# Patient Record
Sex: MALE | Race: WHITE | NOT HISPANIC OR LATINO | Employment: OTHER | URBAN - METROPOLITAN AREA
[De-identification: names, ages, dates, MRNs, and addresses within clinical notes are randomized per-mention and may not be internally consistent; named-entity substitution may affect disease eponyms.]

---

## 2024-04-05 ENCOUNTER — APPOINTMENT (OUTPATIENT)
Dept: CARDIOLOGY | Facility: HOSPITAL | Age: 84
DRG: 065 | End: 2024-04-05
Payer: MEDICARE

## 2024-04-05 ENCOUNTER — APPOINTMENT (OUTPATIENT)
Dept: RADIOLOGY | Facility: HOSPITAL | Age: 84
DRG: 065 | End: 2024-04-05
Payer: MEDICARE

## 2024-04-05 ENCOUNTER — HOSPITAL ENCOUNTER (OUTPATIENT)
Facility: HOSPITAL | Age: 84
Discharge: HOME | DRG: 065 | End: 2024-04-06
Attending: STUDENT IN AN ORGANIZED HEALTH CARE EDUCATION/TRAINING PROGRAM | Admitting: INTERNAL MEDICINE
Payer: MEDICARE

## 2024-04-05 DIAGNOSIS — R29.90 STROKE-LIKE EPISODE: ICD-10-CM

## 2024-04-05 DIAGNOSIS — I63.89 AC ISCH MULTI VASC TERRITORIES STROKE (MULTI): ICD-10-CM

## 2024-04-05 DIAGNOSIS — I61.9 CVA (CEREBROVASCULAR ACCIDENT DUE TO INTRACEREBRAL HEMORRHAGE) (MULTI): Primary | ICD-10-CM

## 2024-04-05 LAB
ALBUMIN SERPL BCP-MCNC: 3.5 G/DL (ref 3.4–5)
ALP SERPL-CCNC: 88 U/L (ref 33–136)
ALT SERPL W P-5'-P-CCNC: 8 U/L (ref 10–52)
ANION GAP SERPL CALC-SCNC: 14 MMOL/L (ref 10–20)
APTT PPP: 34 SECONDS (ref 27–38)
AST SERPL W P-5'-P-CCNC: 12 U/L (ref 9–39)
BASOPHILS # BLD AUTO: 0.03 X10*3/UL (ref 0–0.1)
BASOPHILS NFR BLD AUTO: 0.4 %
BILIRUB SERPL-MCNC: 0.6 MG/DL (ref 0–1.2)
BUN SERPL-MCNC: 26 MG/DL (ref 6–23)
CALCIUM SERPL-MCNC: 8.5 MG/DL (ref 8.6–10.3)
CARDIAC TROPONIN I PNL SERPL HS: 20 NG/L (ref 0–20)
CHLORIDE SERPL-SCNC: 105 MMOL/L (ref 98–107)
CO2 SERPL-SCNC: 25 MMOL/L (ref 21–32)
CREAT SERPL-MCNC: 2.02 MG/DL (ref 0.5–1.3)
EGFRCR SERPLBLD CKD-EPI 2021: 32 ML/MIN/1.73M*2
EOSINOPHIL # BLD AUTO: 0.2 X10*3/UL (ref 0–0.4)
EOSINOPHIL NFR BLD AUTO: 2.6 %
ERYTHROCYTE [DISTWIDTH] IN BLOOD BY AUTOMATED COUNT: 16.7 % (ref 11.5–14.5)
GLUCOSE BLD MANUAL STRIP-MCNC: 85 MG/DL (ref 74–99)
GLUCOSE SERPL-MCNC: 97 MG/DL (ref 74–99)
HCT VFR BLD AUTO: 37.6 % (ref 41–52)
HGB BLD-MCNC: 11.3 G/DL (ref 13.5–17.5)
IMM GRANULOCYTES # BLD AUTO: 0.02 X10*3/UL (ref 0–0.5)
IMM GRANULOCYTES NFR BLD AUTO: 0.3 % (ref 0–0.9)
INR PPP: 1.1 (ref 0.9–1.1)
LYMPHOCYTES # BLD AUTO: 1.28 X10*3/UL (ref 0.8–3)
LYMPHOCYTES NFR BLD AUTO: 16.4 %
MCH RBC QN AUTO: 24.6 PG (ref 26–34)
MCHC RBC AUTO-ENTMCNC: 30.1 G/DL (ref 32–36)
MCV RBC AUTO: 82 FL (ref 80–100)
MONOCYTES # BLD AUTO: 0.39 X10*3/UL (ref 0.05–0.8)
MONOCYTES NFR BLD AUTO: 5 %
NEUTROPHILS # BLD AUTO: 5.88 X10*3/UL (ref 1.6–5.5)
NEUTROPHILS NFR BLD AUTO: 75.3 %
NRBC BLD-RTO: 0 /100 WBCS (ref 0–0)
PLATELET # BLD AUTO: 201 X10*3/UL (ref 150–450)
POTASSIUM SERPL-SCNC: 4 MMOL/L (ref 3.5–5.3)
PROT SERPL-MCNC: 6 G/DL (ref 6.4–8.2)
PROTHROMBIN TIME: 12.9 SECONDS (ref 9.8–12.8)
RBC # BLD AUTO: 4.59 X10*6/UL (ref 4.5–5.9)
SODIUM SERPL-SCNC: 140 MMOL/L (ref 136–145)
WBC # BLD AUTO: 7.8 X10*3/UL (ref 4.4–11.3)

## 2024-04-05 PROCEDURE — 70496 CT ANGIOGRAPHY HEAD: CPT | Performed by: RADIOLOGY

## 2024-04-05 PROCEDURE — 84484 ASSAY OF TROPONIN QUANT: CPT

## 2024-04-05 PROCEDURE — 85025 COMPLETE CBC W/AUTO DIFF WBC: CPT

## 2024-04-05 PROCEDURE — 93005 ELECTROCARDIOGRAM TRACING: CPT

## 2024-04-05 PROCEDURE — 70496 CT ANGIOGRAPHY HEAD: CPT

## 2024-04-05 PROCEDURE — 99291 CRITICAL CARE FIRST HOUR: CPT | Performed by: STUDENT IN AN ORGANIZED HEALTH CARE EDUCATION/TRAINING PROGRAM

## 2024-04-05 PROCEDURE — 70553 MRI BRAIN STEM W/O & W/DYE: CPT

## 2024-04-05 PROCEDURE — A9575 INJ GADOTERATE MEGLUMI 0.1ML: HCPCS | Performed by: INTERNAL MEDICINE

## 2024-04-05 PROCEDURE — 99222 1ST HOSP IP/OBS MODERATE 55: CPT | Performed by: NURSE PRACTITIONER

## 2024-04-05 PROCEDURE — 80053 COMPREHEN METABOLIC PANEL: CPT

## 2024-04-05 PROCEDURE — 82947 ASSAY GLUCOSE BLOOD QUANT: CPT | Mod: 59

## 2024-04-05 PROCEDURE — 2550000001 HC RX 255 CONTRASTS: Performed by: STUDENT IN AN ORGANIZED HEALTH CARE EDUCATION/TRAINING PROGRAM

## 2024-04-05 PROCEDURE — 2500000004 HC RX 250 GENERAL PHARMACY W/ HCPCS (ALT 636 FOR OP/ED)

## 2024-04-05 PROCEDURE — 2550000001 HC RX 255 CONTRASTS: Performed by: INTERNAL MEDICINE

## 2024-04-05 PROCEDURE — 70498 CT ANGIOGRAPHY NECK: CPT | Performed by: RADIOLOGY

## 2024-04-05 PROCEDURE — 92610 EVALUATE SWALLOWING FUNCTION: CPT | Mod: GN | Performed by: SPEECH-LANGUAGE PATHOLOGIST

## 2024-04-05 PROCEDURE — 36415 COLL VENOUS BLD VENIPUNCTURE: CPT

## 2024-04-05 PROCEDURE — 70450 CT HEAD/BRAIN W/O DYE: CPT | Performed by: RADIOLOGY

## 2024-04-05 PROCEDURE — 85730 THROMBOPLASTIN TIME PARTIAL: CPT

## 2024-04-05 PROCEDURE — 70553 MRI BRAIN STEM W/O & W/DYE: CPT | Performed by: RADIOLOGY

## 2024-04-05 PROCEDURE — 85610 PROTHROMBIN TIME: CPT

## 2024-04-05 PROCEDURE — 99236 HOSP IP/OBS SAME DATE HI 85: CPT

## 2024-04-05 PROCEDURE — 70450 CT HEAD/BRAIN W/O DYE: CPT

## 2024-04-05 PROCEDURE — 2500000001 HC RX 250 WO HCPCS SELF ADMINISTERED DRUGS (ALT 637 FOR MEDICARE OP)

## 2024-04-05 RX ORDER — TAMSULOSIN HYDROCHLORIDE 0.4 MG/1
0.8 CAPSULE ORAL DAILY
COMMUNITY

## 2024-04-05 RX ORDER — NITROGLYCERIN 0.4 MG/1
0.4 TABLET SUBLINGUAL EVERY 5 MIN PRN
COMMUNITY

## 2024-04-05 RX ORDER — HYDRALAZINE HYDROCHLORIDE 25 MG/1
25 TABLET, FILM COATED ORAL 2 TIMES DAILY
COMMUNITY

## 2024-04-05 RX ORDER — NAPROXEN SODIUM 220 MG/1
81 TABLET, FILM COATED ORAL DAILY
COMMUNITY

## 2024-04-05 RX ORDER — LIDOCAINE 50 MG/G
1 OINTMENT TOPICAL 2 TIMES DAILY PRN
COMMUNITY

## 2024-04-05 RX ORDER — ENOXAPARIN SODIUM 100 MG/ML
30 INJECTION SUBCUTANEOUS EVERY 24 HOURS
Status: DISCONTINUED | OUTPATIENT
Start: 2024-04-05 | End: 2024-04-06 | Stop reason: HOSPADM

## 2024-04-05 RX ORDER — POLYETHYLENE GLYCOL 3350 17 G/17G
17 POWDER, FOR SOLUTION ORAL DAILY
Status: DISCONTINUED | OUTPATIENT
Start: 2024-04-05 | End: 2024-04-06 | Stop reason: HOSPADM

## 2024-04-05 RX ORDER — LOPERAMIDE HCL 2 MG
2 TABLET ORAL EVERY 6 HOURS PRN
COMMUNITY

## 2024-04-05 RX ORDER — FINASTERIDE 5 MG/1
5 TABLET, FILM COATED ORAL NIGHTLY
COMMUNITY

## 2024-04-05 RX ORDER — DOCUSATE SODIUM 100 MG/1
100 CAPSULE, LIQUID FILLED ORAL EVERY 12 HOURS PRN
Status: DISCONTINUED | OUTPATIENT
Start: 2024-04-05 | End: 2024-04-06 | Stop reason: HOSPADM

## 2024-04-05 RX ORDER — DOCUSATE SODIUM 100 MG/1
100 CAPSULE, LIQUID FILLED ORAL EVERY 12 HOURS PRN
COMMUNITY

## 2024-04-05 RX ORDER — BENZOCAINE AND MENTHOL, UNSPECIFIED FORM 15; 2.3 MG/1; MG/1
1 LOZENGE ORAL EVERY 2 HOUR PRN
COMMUNITY

## 2024-04-05 RX ORDER — MIRTAZAPINE 7.5 MG/1
7.5 TABLET, FILM COATED ORAL NIGHTLY
COMMUNITY

## 2024-04-05 RX ORDER — FUROSEMIDE 40 MG/1
40 TABLET ORAL AS NEEDED
COMMUNITY

## 2024-04-05 RX ORDER — ACETAMINOPHEN 325 MG/1
650 TABLET ORAL EVERY 4 HOURS PRN
COMMUNITY

## 2024-04-05 RX ORDER — FLUTICASONE PROPIONATE 50 MCG
2 SPRAY, SUSPENSION (ML) NASAL DAILY PRN
COMMUNITY

## 2024-04-05 RX ORDER — NAPROXEN SODIUM 220 MG/1
81 TABLET, FILM COATED ORAL DAILY
Status: DISCONTINUED | OUTPATIENT
Start: 2024-04-05 | End: 2024-04-06 | Stop reason: HOSPADM

## 2024-04-05 RX ORDER — ATORVASTATIN CALCIUM 40 MG/1
40 TABLET, FILM COATED ORAL NIGHTLY
Status: DISCONTINUED | OUTPATIENT
Start: 2024-04-05 | End: 2024-04-05

## 2024-04-05 RX ORDER — ATORVASTATIN CALCIUM 80 MG/1
80 TABLET, FILM COATED ORAL NIGHTLY
Status: DISCONTINUED | OUTPATIENT
Start: 2024-04-05 | End: 2024-04-06 | Stop reason: HOSPADM

## 2024-04-05 RX ORDER — GADOTERATE MEGLUMINE 376.9 MG/ML
12 INJECTION INTRAVENOUS
Status: COMPLETED | OUTPATIENT
Start: 2024-04-05 | End: 2024-04-05

## 2024-04-05 RX ORDER — FLUOXETINE HYDROCHLORIDE 20 MG/1
40 CAPSULE ORAL DAILY
COMMUNITY

## 2024-04-05 RX ORDER — ISOSORBIDE DINITRATE 10 MG/1
10 TABLET ORAL 3 TIMES DAILY
COMMUNITY

## 2024-04-05 RX ORDER — ALLOPURINOL 100 MG/1
100 TABLET ORAL DAILY
COMMUNITY

## 2024-04-05 RX ORDER — METOPROLOL SUCCINATE 25 MG/1
25 TABLET, EXTENDED RELEASE ORAL DAILY
COMMUNITY

## 2024-04-05 RX ORDER — ATORVASTATIN CALCIUM 80 MG/1
80 TABLET, FILM COATED ORAL NIGHTLY
COMMUNITY

## 2024-04-05 RX ADMIN — ATORVASTATIN CALCIUM 80 MG: 80 TABLET, FILM COATED ORAL at 21:04

## 2024-04-05 RX ADMIN — IOHEXOL 70 ML: 350 INJECTION, SOLUTION INTRAVENOUS at 11:49

## 2024-04-05 RX ADMIN — ENOXAPARIN SODIUM 30 MG: 30 INJECTION SUBCUTANEOUS at 21:04

## 2024-04-05 RX ADMIN — GADOTERATE MEGLUMINE 12 ML: 376.9 INJECTION INTRAVENOUS at 17:45

## 2024-04-05 SDOH — SOCIAL STABILITY: SOCIAL INSECURITY: DO YOU FEEL ANYONE HAS EXPLOITED OR TAKEN ADVANTAGE OF YOU FINANCIALLY OR OF YOUR PERSONAL PROPERTY?: NO

## 2024-04-05 SDOH — SOCIAL STABILITY: SOCIAL INSECURITY: ABUSE: ADULT

## 2024-04-05 SDOH — SOCIAL STABILITY: SOCIAL INSECURITY: ARE THERE ANY APPARENT SIGNS OF INJURIES/BEHAVIORS THAT COULD BE RELATED TO ABUSE/NEGLECT?: NO

## 2024-04-05 SDOH — SOCIAL STABILITY: SOCIAL INSECURITY: ARE YOU OR HAVE YOU BEEN THREATENED OR ABUSED PHYSICALLY, EMOTIONALLY, OR SEXUALLY BY ANYONE?: NO

## 2024-04-05 SDOH — SOCIAL STABILITY: SOCIAL INSECURITY: DO YOU FEEL UNSAFE GOING BACK TO THE PLACE WHERE YOU ARE LIVING?: NO

## 2024-04-05 SDOH — SOCIAL STABILITY: SOCIAL INSECURITY: HAS ANYONE EVER THREATENED TO HURT YOUR FAMILY OR YOUR PETS?: NO

## 2024-04-05 SDOH — SOCIAL STABILITY: SOCIAL INSECURITY: DOES ANYONE TRY TO KEEP YOU FROM HAVING/CONTACTING OTHER FRIENDS OR DOING THINGS OUTSIDE YOUR HOME?: NO

## 2024-04-05 SDOH — SOCIAL STABILITY: SOCIAL INSECURITY: WERE YOU ABLE TO COMPLETE ALL THE BEHAVIORAL HEALTH SCREENINGS?: YES

## 2024-04-05 SDOH — SOCIAL STABILITY: SOCIAL INSECURITY: HAVE YOU HAD THOUGHTS OF HARMING ANYONE ELSE?: NO

## 2024-04-05 ASSESSMENT — ACTIVITIES OF DAILY LIVING (ADL)
LACK_OF_TRANSPORTATION: NO
ASSISTIVE_DEVICE: WHEELCHAIR
PATIENT'S MEMORY ADEQUATE TO SAFELY COMPLETE DAILY ACTIVITIES?: YES
TOILETING: NEEDS ASSISTANCE
JUDGMENT_ADEQUATE_SAFELY_COMPLETE_DAILY_ACTIVITIES: YES
HEARING - RIGHT EAR: FUNCTIONAL
WALKS IN HOME: DEPENDENT
HEARING - LEFT EAR: FUNCTIONAL
DRESSING YOURSELF: DEPENDENT
GROOMING: NEEDS ASSISTANCE
ADEQUATE_TO_COMPLETE_ADL: YES
BATHING: NEEDS ASSISTANCE
FEEDING YOURSELF: NEEDS ASSISTANCE

## 2024-04-05 ASSESSMENT — PAIN SCALES - GENERAL
PAINLEVEL_OUTOF10: 5 - MODERATE PAIN
PAINLEVEL_OUTOF10: 4
PAINLEVEL_OUTOF10: 5 - MODERATE PAIN
PAINLEVEL_OUTOF10: 3

## 2024-04-05 ASSESSMENT — COGNITIVE AND FUNCTIONAL STATUS - GENERAL
MOVING FROM LYING ON BACK TO SITTING ON SIDE OF FLAT BED WITH BEDRAILS: A LITTLE
DAILY ACTIVITIY SCORE: 12
MOBILITY SCORE: 13
CLIMB 3 TO 5 STEPS WITH RAILING: A LOT
PERSONAL GROOMING: A LOT
PERSONAL GROOMING: A LOT
WALKING IN HOSPITAL ROOM: A LOT
DAILY ACTIVITIY SCORE: 12
TOILETING: TOTAL
CLIMB 3 TO 5 STEPS WITH RAILING: A LOT
HELP NEEDED FOR BATHING: A LOT
EATING MEALS: A LITTLE
MOVING FROM LYING ON BACK TO SITTING ON SIDE OF FLAT BED WITH BEDRAILS: A LITTLE
TURNING FROM BACK TO SIDE WHILE IN FLAT BAD: A LOT
HELP NEEDED FOR BATHING: A LOT
PATIENT BASELINE BEDBOUND: NO
DRESSING REGULAR LOWER BODY CLOTHING: A LOT
STANDING UP FROM CHAIR USING ARMS: A LOT
STANDING UP FROM CHAIR USING ARMS: A LOT
MOVING TO AND FROM BED TO CHAIR: A LOT
MOBILITY SCORE: 13
MOVING TO AND FROM BED TO CHAIR: A LOT
TOILETING: TOTAL
DRESSING REGULAR UPPER BODY CLOTHING: A LOT
TURNING FROM BACK TO SIDE WHILE IN FLAT BAD: A LOT
DRESSING REGULAR UPPER BODY CLOTHING: A LOT
DRESSING REGULAR LOWER BODY CLOTHING: A LOT
EATING MEALS: A LITTLE
WALKING IN HOSPITAL ROOM: A LOT

## 2024-04-05 ASSESSMENT — PAIN DESCRIPTION - PROGRESSION: CLINICAL_PROGRESSION: NOT CHANGED

## 2024-04-05 ASSESSMENT — COLUMBIA-SUICIDE SEVERITY RATING SCALE - C-SSRS
1. IN THE PAST MONTH, HAVE YOU WISHED YOU WERE DEAD OR WISHED YOU COULD GO TO SLEEP AND NOT WAKE UP?: NO
6. HAVE YOU EVER DONE ANYTHING, STARTED TO DO ANYTHING, OR PREPARED TO DO ANYTHING TO END YOUR LIFE?: NO
2. HAVE YOU ACTUALLY HAD ANY THOUGHTS OF KILLING YOURSELF?: NO

## 2024-04-05 ASSESSMENT — LIFESTYLE VARIABLES
AUDIT-C TOTAL SCORE: 0
AUDIT-C TOTAL SCORE: 0
HOW OFTEN DO YOU HAVE A DRINK CONTAINING ALCOHOL: NEVER
HOW MANY STANDARD DRINKS CONTAINING ALCOHOL DO YOU HAVE ON A TYPICAL DAY: PATIENT DOES NOT DRINK
SKIP TO QUESTIONS 9-10: 1
HOW OFTEN DO YOU HAVE 6 OR MORE DRINKS ON ONE OCCASION: NEVER

## 2024-04-05 ASSESSMENT — ENCOUNTER SYMPTOMS
DIZZINESS: 0
HEADACHES: 0
FACIAL ASYMMETRY: 0
TREMORS: 0
FEVER: 0
SHORTNESS OF BREATH: 0
WEAKNESS: 0
NUMBNESS: 0
VOMITING: 0
SPEECH DIFFICULTY: 0
NAUSEA: 0
CHEST TIGHTNESS: 0
LIGHT-HEADEDNESS: 0

## 2024-04-05 ASSESSMENT — PATIENT HEALTH QUESTIONNAIRE - PHQ9
1. LITTLE INTEREST OR PLEASURE IN DOING THINGS: NOT AT ALL
2. FEELING DOWN, DEPRESSED OR HOPELESS: NOT AT ALL
SUM OF ALL RESPONSES TO PHQ9 QUESTIONS 1 & 2: 0

## 2024-04-05 ASSESSMENT — PAIN - FUNCTIONAL ASSESSMENT: PAIN_FUNCTIONAL_ASSESSMENT: 0-10

## 2024-04-05 NOTE — H&P
"History Of Present Illness  Cuong Cheek is a 84 y.o. male CAD (s/p BMS to LAD in 2008), HTN, HLD, EtOH use disorder, tobacco use disorder, CKD, PAD, Gout, BPH s/p TURP presenting from his nursing facility after falling out of his wheelchair earlier today.  Patient remembers the event and says that he was reaching down to pick something up off the ground when he slid forward out of his chair and hit his head on a piece of furniture.  Nursing home staff then came to evaluate the patient and noticed that he was having left-sided arm and leg weakness as well as left facial droop and \"garbled speech\".  EMS was called and on their assessment he was having similar deficits specifically the left leg weakness.  He was brought to the emergency department and a brain attack was called and neurology was called.  Initial NIH at that time was 5.  CT head performed and revealed no acute hemorrhage however did show chronic findings (see below).  CT angio head and neck with and without contrast revealed multiple occlusions and other findings that based on Dr. Littlejohn's assessment appeared to be chronic.  He was not given TNK.  Upon returning from imaging, his NIH stroke scale was a 1, and his weakness, garbled speech, and facial droop had all resolved.  Patient says that he remembers the entire incident and had no precipitating symptoms such as chest pain, shortness of breath, lightheadedness, dizziness, visual changes, nausea, vomiting.  At present he denies having any pain other than the discomfort of sitting in the hospital bed.  His daughter is at bedside and reports that he is at his baseline mentally and physically in her assessment.  Patient to be admitted for further workup of stroke versus TIA versus seizure activity with neurology on consult.    ED course  Initial vitals: Afebrile 36.4, HR 86, RR 16, /68, 98% on room air  Labs: CMP significant for BUN/creatinine of 26/2.02 (appears to be near baseline), normal " electrolytes.  CBC reveals H&H 11.3/37.6 (baseline), no leukocytosis or thrombocytopenia.  PT/INR 1.1/12.9 respectively.  Imaging: CT brain attack head revealed no acute hemorrhage. Other findings are chronic- afua of  edema of the right posterior parietal and trigone region has density value would suggest subacute possibly chronic process, Right parotid gland mass. CT angio brain attack revealed chronic changes (see report below)  EKG: Normal sinus rhythm no acute injury pattern  ED interventions: Brain attack called and neurology consulted.         Past Medical History  He has no past medical history on file.    Surgical History  He has no past surgical history on file.     Social History  He has no history on file for tobacco use, alcohol use, and drug use.    Family History  No family history on file.     Allergies  Patient has no known allergies.    Review of Systems   Constitutional:  Negative for fever.   Respiratory:  Negative for chest tightness and shortness of breath.    Cardiovascular:  Negative for chest pain.   Gastrointestinal:  Negative for nausea and vomiting.   Neurological:  Negative for dizziness, tremors, facial asymmetry, speech difficulty, weakness, light-headedness, numbness and headaches.   All other systems reviewed and are negative.       Physical Exam  Vitals reviewed.   Constitutional:       General: He is not in acute distress.     Appearance: Normal appearance. He is not toxic-appearing.   HENT:      Head: Normocephalic and atraumatic.   Eyes:      General: No visual field deficit.  Cardiovascular:      Rate and Rhythm: Normal rate and regular rhythm.      Heart sounds: No murmur heard.     No friction rub. No gallop.   Pulmonary:      Effort: Pulmonary effort is normal. No respiratory distress.      Breath sounds: Normal breath sounds.   Skin:     General: Skin is warm and dry.   Neurological:      General: No focal deficit present.      Mental Status: He is alert. Mental status is at  baseline.      GCS: GCS eye subscore is 4. GCS verbal subscore is 5. GCS motor subscore is 6.      Cranial Nerves: No facial asymmetry.      Sensory: No sensory deficit.      Motor: No weakness or pronator drift.      Comments: NIH of 1 on reevaluation   Psychiatric:         Mood and Affect: Mood normal.         Behavior: Behavior normal.          Last Recorded Vitals  /68 (BP Location: Right arm, Patient Position: Sitting)   Pulse 57   Temp 36.4 °C (97.5 °F) (Temporal)   Resp 18   Wt 60 kg (132 lb 4.4 oz)   SpO2 99%     Relevant Results        Scheduled medications  aspirin, 81 mg, oral, Daily  atorvastatin, 80 mg, oral, Nightly      Continuous medications     PRN medications  Results for orders placed or performed during the hospital encounter of 04/05/24 (from the past 24 hour(s))   CBC and Auto Differential   Result Value Ref Range    WBC 7.8 4.4 - 11.3 x10*3/uL    nRBC 0.0 0.0 - 0.0 /100 WBCs    RBC 4.59 4.50 - 5.90 x10*6/uL    Hemoglobin 11.3 (L) 13.5 - 17.5 g/dL    Hematocrit 37.6 (L) 41.0 - 52.0 %    MCV 82 80 - 100 fL    MCH 24.6 (L) 26.0 - 34.0 pg    MCHC 30.1 (L) 32.0 - 36.0 g/dL    RDW 16.7 (H) 11.5 - 14.5 %    Platelets 201 150 - 450 x10*3/uL    Neutrophils % 75.3 40.0 - 80.0 %    Immature Granulocytes %, Automated 0.3 0.0 - 0.9 %    Lymphocytes % 16.4 13.0 - 44.0 %    Monocytes % 5.0 2.0 - 10.0 %    Eosinophils % 2.6 0.0 - 6.0 %    Basophils % 0.4 0.0 - 2.0 %    Neutrophils Absolute 5.88 (H) 1.60 - 5.50 x10*3/uL    Immature Granulocytes Absolute, Automated 0.02 0.00 - 0.50 x10*3/uL    Lymphocytes Absolute 1.28 0.80 - 3.00 x10*3/uL    Monocytes Absolute 0.39 0.05 - 0.80 x10*3/uL    Eosinophils Absolute 0.20 0.00 - 0.40 x10*3/uL    Basophils Absolute 0.03 0.00 - 0.10 x10*3/uL   Comprehensive metabolic panel   Result Value Ref Range    Glucose 97 74 - 99 mg/dL    Sodium 140 136 - 145 mmol/L    Potassium 4.0 3.5 - 5.3 mmol/L    Chloride 105 98 - 107 mmol/L    Bicarbonate 25 21 - 32 mmol/L     Anion Gap 14 10 - 20 mmol/L    Urea Nitrogen 26 (H) 6 - 23 mg/dL    Creatinine 2.02 (H) 0.50 - 1.30 mg/dL    eGFR 32 (L) >60 mL/min/1.73m*2    Calcium 8.5 (L) 8.6 - 10.3 mg/dL    Albumin 3.5 3.4 - 5.0 g/dL    Alkaline Phosphatase 88 33 - 136 U/L    Total Protein 6.0 (L) 6.4 - 8.2 g/dL    AST 12 9 - 39 U/L    Bilirubin, Total 0.6 0.0 - 1.2 mg/dL    ALT 8 (L) 10 - 52 U/L   Troponin I, High Sensitivity   Result Value Ref Range    Troponin I, High Sensitivity 20 0 - 20 ng/L   Protime-INR   Result Value Ref Range    Protime 12.9 (H) 9.8 - 12.8 seconds    INR 1.1 0.9 - 1.1   APTT   Result Value Ref Range    aPTT 34 27 - 38 seconds   POCT GLUCOSE   Result Value Ref Range    POCT Glucose 85 74 - 99 mg/dL   ECG 12 lead   Result Value Ref Range    Ventricular Rate 77 BPM    Atrial Rate 77 BPM    WY Interval 178 ms    QRS Duration 94 ms    QT Interval 350 ms    QTC Calculation(Bazett) 396 ms    P Axis 63 degrees    R Axis 62 degrees    T Axis 254 degrees    QRS Count 13 beats    Q Onset 221 ms    P Onset 132 ms    P Offset 178 ms    T Offset 396 ms    QTC Fredericia 380 ms   ECG 12 lead   Result Value Ref Range    Ventricular Rate 69 BPM    Atrial Rate 69 BPM    WY Interval 152 ms    QRS Duration 96 ms    QT Interval 432 ms    QTC Calculation(Bazett) 462 ms    P Axis 46 degrees    R Axis 96 degrees    T Axis 243 degrees    QRS Count 11 beats    Q Onset 221 ms    P Onset 145 ms    P Offset 178 ms    T Offset 437 ms    QTC Fredericia 452 ms     ECG 12 lead    Result Date: 4/5/2024  Normal sinus rhythm Rightward axis ST & T wave abnormality, consider inferior ischemia Prolonged QT Abnormal ECG When compared with ECG of 05-APR-2024 11:37, (unconfirmed) QT has lengthened    ECG 12 lead    Result Date: 4/5/2024  Normal sinus rhythm Septal infarct , age undetermined ST & T wave abnormality, consider inferior ischemia Abnormal ECG No previous ECGs available    CT brain attack head wo IV contrast    Result Date: 4/5/2024  Interpreted  By:  Mirza Gregorio, STUDY: CT BRAIN ATTACK HEAD WO IV CONTRAST;  4/5/2024 11:26 am   INDICATION: Signs/Symptoms:Stroke Evaluation, left visual field deficit, left leg weakness.   COMPARISON: No prior examination available for comparison. If a prior examination becomes available, this examination will be compared to the prior study. Addendum report will be issued.   ACCESSION NUMBER(S): [id number]   ORDERING CLINICIAN: NOEL VALDEZ   TECHNIQUE: Noncontrast axial CT scan of head was performed. Angled reformats in brain and bone windows were generated. The images were reviewed in bone, brain, blood and soft tissue windows.   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 Follow-up ultrasound recommended.   Cerebral atrophy with concordant ventriculomegaly, age-appropriate.   There is low-attenuation of the cortical and subcortical white matter of the right occipital and parietal regions and right posterior trigone. Density value would suggest subacute or chronic.  MRI is best suited for determining acuity. No significant mass effect. No hemorrhage identified.   Small old lacunar infarct of the deep white matter right side axial image 18 measuring 4 mm.       There is no acute hemorrhage. Other findings appear chronic. Area of edema of the right posterior parietal and trigone region has density value would suggest subacute possibly chronic process, best followed up with MRI examination.   Right parotid gland mass   Please refer to the CT a report.   Results sent to the patient's clinician by secure chat at 12:38 p.m. on 04/05/2024. Response from clinician received.   MACRO: None   Signed by: Mirza Gregorio 4/5/2024 12:39 PM Dictation workstation:   [id number]    CT brain attack angio head and neck W and WO IV contrast    Result Date: 4/5/2024  Interpreted By:  Virgilio Herr, STUDY: CT BRAIN ATTACK ANGIO HEAD AND NECK W AND WO IV CONTRAST performed 4/5/2024   INDICATION: Signs/Symptoms:Stroke Evaluation, left  visual field deficit, left leg weakness   COMPARISON: Concurrent CT head.   ACCESSION NUMBER(S): [id number]   ORDERING CLINICIAN: NOEL VALDEZ   TECHNIQUE: Axial CTA imaging was obtained through the head and neck following the administration of 70 mL Omnipaque 350 intravenous contrast. Coronal, sagittal, MIP, and 3D reconstructions were obtained. 3D reconstructions were rendered using a separate 3D workstation.   FINDINGS: NECK:   There is a 4.4 x 2.4 x 2.4 cm enhancing mass associated with the superficial aspect of the right parotid gland along the posterior and inferior aspect (series 508, image 107, series 506, image 443). Otherwise no definite cervical lymphadenopathy. Patent airway. The left parotid gland and bilateral submandibular glands are unremarkable. Thyroid gland is normal. No acute osseous abnormality of the cervical spine. Mild emphysema. Fluid/debris within the esophagus.   VASCULAR FINDINGS:   Aorta and subclavian arteries:Normal three vessel aortic arch configuration.Moderate atherosclerotic calcification of the aortic arch with involvement of the great vessel origins. Subclavian arteries are patent without flow significant stenosis.   Common carotid arteries: Patent bilaterally without flow significant stenosis. There is diffuse noncalcified atheromatous plaque involvement throughout the common carotid arteries with mild narrowing on the right.   External carotid arteries: Patent bilaterally.   Internal carotid arteries: The right internal carotid artery origin is occluded at its origin. There is collateralized filling/partial opacification of the distal right intracranial internal carotid artery segments extending proximally to the ophthalmic artery. Atherosclerotic involvement of the left carotid bifurcation and left internal carotid artery origin with estimated 40% stenosis by NASCET criteria. There is tortuosity and mild fusiform dilatation of the mid to distal cervical left internal carotid  artery. Additional atherosclerotic irregularity of the parasellar intracranial left internal carotid artery segments with mild luminal narrowing but no flow significant stenosis.   Anterior cerebral arteries: Poorly opacified, likely diminutive right A1 segment. The left A1 segment is well opacified. The bilateral A2 segments appear well opacified.   Middle cerebral arteries: There is significantly reduced enhancement within the right MCA diffusely relative to the left. The left MCA appears patent without flow significant proximal stenosis.   Vertebral arteries: Suggested severe stenosis of the right vertebral artery origin secondary to calcified plaque components (series 506, image 189). There is additional mild atherosclerotic narrowing and irregularity of the right vertebral artery with patent appearance. The left vertebral artery is occluded at its origin with partial reconstitution beginning at the C5 vertebral body level. There is additional irregularity and narrowing throughout the remainder of the left cervical segments. There is suggestion of additional occlusion versus severe stenosis of the proximal left V4 segment with thready intermittent enhancement of the remainder of the left V4 segment.   Basilar artery: Short segment occlusion at the inferior vertebrobasilar junction with additional irregular enhancement of the remainder of the basilar artery.   Posterior communicating arteries: Visualized bilaterally.   Posterior cerebral arteries: Severe focal stenosis involving the left P2 segment (series 503, image 213). The bilateral P1 and proximal P2 segments appear otherwise patent. There is additional peripheral atherosclerotic irregularity and likely occlusion of the more distal PCA branches.       1. Occlusion of the right internal carotid artery origin with mild reconstitution/collateralized filling of the distal intracranial internal carotid artery segments extending to the ophthalmic artery. 2.  Significantly reduced enhancement/filling of the right MCA relative to the left with faint generalized enhancement, likely reflecting aforementioned right ICA occlusion and collateralized filling via the [id number]. 3. Occlusion of the left vertebral artery origin with intermittent reconstitution of the distal cervical and intracranial regions. 4. Additional occlusion of the vertebrobasilar region with atherosclerotic narrowing and multifocal stenosis of the basilar artery and proximal left PCA. Atherosclerotic irregularity with potential occlusion of the more distal PCA branches bilaterally. 5. Diffuse atherosclerosis with estimated 40% stenosis of the left internal carotid artery origin. 6. 4.4 cm enhancing mass associated with the posteroinferior aspect of the superficial right parotid gland that may represent a primary salivary gland neoplasm. Consider FNA for pathologic correlation.   MACRO: Virgilio Herr discussed the significance and urgency of this critical finding by telephone with  Dr. Lewis On 4/5/2024 at 12:02 pm. (**-RCF-**) Findings:  See findings.     Signed by: Virgilio Herr 4/5/2024 12:20 PM Dictation workstation:   [id number]        Assessment/Plan   Principal Problem:    CVA (cerebrovascular accident due to intracerebral hemorrhage) (CMS/HCC)    85 yo male with PMH of CAD (s/p BMS to LAD in 2008), HTN, HLD, EtOH use disorder, tobacco use disorder, CKD, PAD, Gout, BPH s/p TURP presenting from his nursing facility after falling out of his wheelchair earlier today.  He is admitted to teaching team for further workup of stroke versus TIA versus seizure.    #CVA vs TIA vs seizure  -CT brain attack with no acute hemorrhage.  Chronic changes as noted above  -CT angio revealing chronic changes based on assessment by Dr. Littlejohn.  Chronic changes as noted above  -TNK was not given and no plan for thrombectomy  -No history of seizures and no reports of shaking, loss of bowel or bladder  continence, postictal state  -Neurology consulted.  Plan for MRI brain with and without IV contrast and EEG  -TTE ordered and pending  -SLP consulted, n.p.o. until evaluation  -Every hour neurochecks  -Allow for permissive hypertension in setting of possible ischemic stroke, will defer to neurology at this time for further recommendations  -Risk stratification: Hemoglobin A1c from 12/20/2023 5.6%, lipid panel from 12/20/2023: Total cholesterol 142, HDL 33, LDL 90, triglycerides 145  -Given one-time dose of aspirin 324 and then will continue aspirin 81 mg  -Started 80 mg atorvastatin daily  -Fall precautions and seizure precautions    #CAD s/p stent  #Hypertension  Hyperlipidemia  #EtOH use disorder  #Tobacco use disorder  #CKD  #PAD  #Gout  #BPH s/p TURP  -Currently holding blood pressure medications in setting of possible ischemic stroke.  Will allow for permissive hypertension and defer to neurology for restarting these medications  -Restart home medications as appropriate    Diet: Pending SLP evaluation  Consults: Neurology  CODE STATUS: DNR DNI      Assessment and plan to be discussed with attending physician    Matthew Olivares DO  Family medicine, PGY2

## 2024-04-05 NOTE — CONSULTS
"Inpatient consult to Neurology  Consult performed by: Judi Hall, ALIYAH-CNP  Consult ordered by: Mika Lewis, DO          History Of Present Illness  Cuong Cheek is a 84 y.o. male presenting with stroke like symptoms. Pt reports that he was in his room at the [facility] ([facility]) this morning in his wheelchair when he used an emery board to file his nails. It dropped on the floor and when he bent over to pick it up, he fell forward out of his wheelchair, hitting left side of his head on a dresser. When staff came in to see him, he had a facial droop so EMS was called. EMS noted that pt had a left arm drift, facial droop, slurred speech, and vision changes. His glucose was 118. Pt states he was reluctant to come to the hospital but when he heard the word \"stroke,\" he thought he should get checked out. In the ED, his vital signs were stable. CT head and CT angio results are noted below. Pt was seen at bedside with Dr. Littlejohn. He currently denies any focal neurological deficits. He was at [facility] in Dec 2023 for NSTEMI and had cardiac cath done. His daughter also states that during that time he may have had a stroke but they were never given a final report when the patient left the hospital. It appears that he has a subacute stroke on imaging (this was reviewed with Dr. Littlejohn). Pt currently denies headache, dizziness, confusion, new vision or speech changes, limb weakness, or sensory changes.     Past Medical History  No past medical history on file.  Surgical History  No past surgical history on file.  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  Medications Prior to Admission   Medication Sig Dispense Refill Last Dose    acetaminophen (Tylenol) 325 mg tablet Take 2 tablets (650 mg) by mouth every 4 hours if " needed for mild pain (1 - 3).       allopurinol (Zyloprim) 100 mg tablet Take 1 tablet (100 mg) by mouth once daily.       aspirin 81 mg chewable tablet Chew 1 tablet (81 mg) once daily.       atorvastatin (Lipitor) 80 mg tablet Take 1 tablet (80 mg) by mouth once daily at bedtime.       benzocaine-menthoL (Cepacol Sore Throat, chris-men,) 15-2.3 mg lozenge Place 1 lozenge into mouth between cheek and gum every 2 hours if needed (sore throat).       docusate sodium (Colace) 100 mg capsule Take 1 capsule (100 mg) by mouth every 12 hours if needed for constipation.       finasteride (Proscar) 5 mg tablet Take 1 tablet (5 mg) by mouth once daily at bedtime. Do not crush, chew, or split.       FLUoxetine (PROzac) 20 mg capsule Take 2 capsules (40 mg) by mouth once daily.       fluticasone (Flonase) 50 mcg/actuation nasal spray Administer 2 sprays into each nostril once daily as needed for rhinitis. Shake gently. Before first use, prime pump. After use, clean tip and replace cap.       furosemide (Lasix) 40 mg tablet Take 1 tablet (40 mg) by mouth if needed. Weight gain 3lbs in 3 days, 5lbs 1 week       hydrALAZINE (Apresoline) 25 mg tablet Take 1 tablet (25 mg) by mouth 2 times a day.       isosorbide dinitrate (Isordil) 10 mg tablet Take 1 tablet (10 mg) by mouth 3 times a day.       lidocaine (Xylocaine) 5 % ointment Apply 1 Application topically 2 times a day as needed for mild pain (1 - 3).       loperamide (Imodium A-D) 2 mg tablet Take 1 tablet (2 mg) by mouth every 6 hours if needed for diarrhea.       metoprolol succinate XL (Toprol-XL) 25 mg 24 hr tablet Take 1 tablet (25 mg) by mouth once daily. Do not crush or chew.       mirtazapine (Remeron) 7.5 mg tablet Take 1 tablet (7.5 mg) by mouth once daily at bedtime.       nitroglycerin (Nitrostat) 0.4 mg SL tablet Place 1 tablet (0.4 mg) under the tongue every 5 minutes if needed for chest pain.       tamsulosin (Flomax) 0.4 mg 24 hr capsule Take 2 capsules (0.8  "mg) by mouth once daily.          Review of Systems  ROS: 12 systems reviewed and negative except per HPI above    Neurological Exam  Physical Exam  Mental Status: Awake and alert. Oriented to person, place and time. Speech was fluent to history. Naming, repetition and comprehension  were intact.   CN: (CN2)- Left visual field deficit. (CN 2,3) PERRL. (CN 3,4,6) EOMI (CN 5)Facial sensation was intact to light touch bilaterally. (CN 7)Facial expression was symmetric (CN 8)- hearing diminished. (CN 12) Tongue protruded midline.   Motor: Normal muscle bulk and tone. Strength (confrontation testing) was (R/L) 5/5 shoulder abduction, elbow flexion/extension,  strenght, hip flexion, knee flexion/extension, ankle dorsi- and plantar flexion. There were no abnormal movements. Limited lift on left side 2/2 shoulder injury. Sensory: Intact to light touch in all 4 extremities. No neglect observed. Coordination (cerebellar function): Finger to nose intact with no dysmetria.   Gait: deferred    Last Recorded Vitals  Blood pressure 124/66, pulse 72, temperature 35.8 °C (96.4 °F), resp. rate 18, height 1.778 m (5' 10\"), weight 60 kg (132 lb 4.4 oz), SpO2 93 %.    Relevant Results  Scheduled medications  aspirin, 81 mg, oral, Daily  atorvastatin, 80 mg, oral, Nightly  enoxaparin, 30 mg, subcutaneous, q24h  polyethylene glycol, 17 g, oral, Daily      Continuous medications     PRN medications  PRN medications: docusate sodium  Results for orders placed or performed during the hospital encounter of 04/05/24 (from the past 24 hour(s))   CBC and Auto Differential   Result Value Ref Range    WBC 7.8 4.4 - 11.3 x10*3/uL    nRBC 0.0 0.0 - 0.0 /100 WBCs    RBC 4.59 4.50 - 5.90 x10*6/uL    Hemoglobin 11.3 (L) 13.5 - 17.5 g/dL    Hematocrit 37.6 (L) 41.0 - 52.0 %    MCV 82 80 - 100 fL    MCH 24.6 (L) 26.0 - 34.0 pg    MCHC 30.1 (L) 32.0 - 36.0 g/dL    RDW 16.7 (H) 11.5 - 14.5 %    Platelets 201 150 - 450 x10*3/uL    Neutrophils % 75.3 " 40.0 - 80.0 %    Immature Granulocytes %, Automated 0.3 0.0 - 0.9 %    Lymphocytes % 16.4 13.0 - 44.0 %    Monocytes % 5.0 2.0 - 10.0 %    Eosinophils % 2.6 0.0 - 6.0 %    Basophils % 0.4 0.0 - 2.0 %    Neutrophils Absolute 5.88 (H) 1.60 - 5.50 x10*3/uL    Immature Granulocytes Absolute, Automated 0.02 0.00 - 0.50 x10*3/uL    Lymphocytes Absolute 1.28 0.80 - 3.00 x10*3/uL    Monocytes Absolute 0.39 0.05 - 0.80 x10*3/uL    Eosinophils Absolute 0.20 0.00 - 0.40 x10*3/uL    Basophils Absolute 0.03 0.00 - 0.10 x10*3/uL   Comprehensive metabolic panel   Result Value Ref Range    Glucose 97 74 - 99 mg/dL    Sodium 140 136 - 145 mmol/L    Potassium 4.0 3.5 - 5.3 mmol/L    Chloride 105 98 - 107 mmol/L    Bicarbonate 25 21 - 32 mmol/L    Anion Gap 14 10 - 20 mmol/L    Urea Nitrogen 26 (H) 6 - 23 mg/dL    Creatinine 2.02 (H) 0.50 - 1.30 mg/dL    eGFR 32 (L) >60 mL/min/1.73m*2    Calcium 8.5 (L) 8.6 - 10.3 mg/dL    Albumin 3.5 3.4 - 5.0 g/dL    Alkaline Phosphatase 88 33 - 136 U/L    Total Protein 6.0 (L) 6.4 - 8.2 g/dL    AST 12 9 - 39 U/L    Bilirubin, Total 0.6 0.0 - 1.2 mg/dL    ALT 8 (L) 10 - 52 U/L   Troponin I, High Sensitivity   Result Value Ref Range    Troponin I, High Sensitivity 20 0 - 20 ng/L   Protime-INR   Result Value Ref Range    Protime 12.9 (H) 9.8 - 12.8 seconds    INR 1.1 0.9 - 1.1   APTT   Result Value Ref Range    aPTT 34 27 - 38 seconds   POCT GLUCOSE   Result Value Ref Range    POCT Glucose 85 74 - 99 mg/dL   ECG 12 lead   Result Value Ref Range    Ventricular Rate 77 BPM    Atrial Rate 77 BPM    NC Interval 178 ms    QRS Duration 94 ms    QT Interval 350 ms    QTC Calculation(Bazett) 396 ms    P Axis 63 degrees    R Axis 62 degrees    T Axis 254 degrees    QRS Count 13 beats    Q Onset 221 ms    P Onset 132 ms    P Offset 178 ms    T Offset 396 ms    QTC Fredericia 380 ms   ECG 12 lead   Result Value Ref Range    Ventricular Rate 69 BPM    Atrial Rate 69 BPM    NC Interval 152 ms    QRS Duration 96 ms     QT Interval 432 ms    QTC Calculation(Bazett) 462 ms    P Axis 46 degrees    R Axis 96 degrees    T Axis 243 degrees    QRS Count 11 beats    Q Onset 221 ms    P Onset 145 ms    P Offset 178 ms    T Offset 437 ms    QTC Fredericia 452 ms         NIH Stroke Scale  1A. Level of Consciousness: Alert, Keenly Responsive  1B. Ask Month and Age: Both Questions Right  1C. Blink Eyes & Squeeze Hands: Performs Both Tasks  2. Best Gaze: Normal  3. Visual: Partial Hemianopia  4. Facial Palsy: Normal Symmetrical Movements  5A. Motor - Left Arm: No Drift  5B. Motor - Right Arm: No Drift  6A. Motor - Left Leg: No Drift  6B. Motor - Right Leg: No Drift  7. Limb Ataxia: Absent  8. Sensory Loss: Normal  9. Best Language: No Aphasia  10. Dysarthria: Normal  11. Extinction and Inattention: No Abnormality  NIH Stroke Scale: 1          I have personally reviewed the following imaging results ECG 12 lead    Result Date: 4/5/2024  Normal sinus rhythm Rightward axis ST & T wave abnormality, consider inferior ischemia Prolonged QT Abnormal ECG When compared with ECG of 05-APR-2024 11:37, (unconfirmed) QT has lengthened    ECG 12 lead    Result Date: 4/5/2024  Normal sinus rhythm Septal infarct , age undetermined ST & T wave abnormality, consider inferior ischemia Abnormal ECG No previous ECGs available    CT brain attack head wo IV contrast    Result Date: 4/5/2024  Interpreted By:  Mirza Gregorio, STUDY: CT BRAIN ATTACK HEAD WO IV CONTRAST;  4/5/2024 11:26 am   INDICATION: Signs/Symptoms:Stroke Evaluation, left visual field deficit, left leg weakness.   COMPARISON: No prior examination available for comparison. If a prior examination becomes available, this examination will be compared to the prior study. Addendum report will be issued.   ACCESSION NUMBER(S): [id number]   ORDERING CLINICIAN: NOEL VALDEZ   TECHNIQUE: Noncontrast axial CT scan of head was performed. Angled reformats in brain and bone windows were generated. The images  were reviewed in bone, brain, blood and soft tissue windows.   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 Follow-up ultrasound recommended.   Cerebral atrophy with concordant ventriculomegaly, age-appropriate.   There is low-attenuation of the cortical and subcortical white matter of the right occipital and parietal regions and right posterior trigone. Density value would suggest subacute or chronic.  MRI is best suited for determining acuity. No significant mass effect. No hemorrhage identified.   Small old lacunar infarct of the deep white matter right side axial image 18 measuring 4 mm.       There is no acute hemorrhage. Other findings appear chronic. Area of edema of the right posterior parietal and trigone region has density value would suggest subacute possibly chronic process, best followed up with MRI examination.   Right parotid gland mass   Please refer to the CT a report.   Results sent to the patient's clinician by secure chat at 12:38 p.m. on 04/05/2024. Response from clinician received.   MACRO: None   Signed by: Mirza Gregorio 4/5/2024 12:39 PM Dictation workstation:   [id number]    CT brain attack angio head and neck W and WO IV contrast    Result Date: 4/5/2024  Interpreted By:  Virgilio Herr, STUDY: CT BRAIN ATTACK ANGIO HEAD AND NECK W AND WO IV CONTRAST performed 4/5/2024   INDICATION: Signs/Symptoms:Stroke Evaluation, left visual field deficit, left leg weakness   COMPARISON: Concurrent CT head.   ACCESSION NUMBER(S): [id number]   ORDERING CLINICIAN: NOEL VALDEZ   TECHNIQUE: Axial CTA imaging was obtained through the head and neck following the administration of 70 mL Omnipaque 350 intravenous contrast. Coronal, sagittal, MIP, and 3D reconstructions were obtained. 3D reconstructions were rendered using a separate 3D workstation.   FINDINGS: NECK:   There is a 4.4 x 2.4 x 2.4 cm enhancing mass associated with the superficial aspect of the right parotid gland along the posterior and  inferior aspect (series 508, image 107, series 506, image 443). Otherwise no definite cervical lymphadenopathy. Patent airway. The left parotid gland and bilateral submandibular glands are unremarkable. Thyroid gland is normal. No acute osseous abnormality of the cervical spine. Mild emphysema. Fluid/debris within the esophagus.   VASCULAR FINDINGS:   Aorta and subclavian arteries:Normal three vessel aortic arch configuration.Moderate atherosclerotic calcification of the aortic arch with involvement of the great vessel origins. Subclavian arteries are patent without flow significant stenosis.   Common carotid arteries: Patent bilaterally without flow significant stenosis. There is diffuse noncalcified atheromatous plaque involvement throughout the common carotid arteries with mild narrowing on the right.   External carotid arteries: Patent bilaterally.   Internal carotid arteries: The right internal carotid artery origin is occluded at its origin. There is collateralized filling/partial opacification of the distal right intracranial internal carotid artery segments extending proximally to the ophthalmic artery. Atherosclerotic involvement of the left carotid bifurcation and left internal carotid artery origin with estimated 40% stenosis by NASCET criteria. There is tortuosity and mild fusiform dilatation of the mid to distal cervical left internal carotid artery. Additional atherosclerotic irregularity of the parasellar intracranial left internal carotid artery segments with mild luminal narrowing but no flow significant stenosis.   Anterior cerebral arteries: Poorly opacified, likely diminutive right A1 segment. The left A1 segment is well opacified. The bilateral A2 segments appear well opacified.   Middle cerebral arteries: There is significantly reduced enhancement within the right MCA diffusely relative to the left. The left MCA appears patent without flow significant proximal stenosis.   Vertebral arteries:  Suggested severe stenosis of the right vertebral artery origin secondary to calcified plaque components (series 506, image 189). There is additional mild atherosclerotic narrowing and irregularity of the right vertebral artery with patent appearance. The left vertebral artery is occluded at its origin with partial reconstitution beginning at the C5 vertebral body level. There is additional irregularity and narrowing throughout the remainder of the left cervical segments. There is suggestion of additional occlusion versus severe stenosis of the proximal left V4 segment with thready intermittent enhancement of the remainder of the left V4 segment.   Basilar artery: Short segment occlusion at the inferior vertebrobasilar junction with additional irregular enhancement of the remainder of the basilar artery.   Posterior communicating arteries: Visualized bilaterally.   Posterior cerebral arteries: Severe focal stenosis involving the left P2 segment (series 503, image 213). The bilateral P1 and proximal P2 segments appear otherwise patent. There is additional peripheral atherosclerotic irregularity and likely occlusion of the more distal PCA branches.       1. Occlusion of the right internal carotid artery origin with mild reconstitution/collateralized filling of the distal intracranial internal carotid artery segments extending to the ophthalmic artery. 2. Significantly reduced enhancement/filling of the right MCA relative to the left with faint generalized enhancement, likely reflecting aforementioned right ICA occlusion and collateralized filling via the [id number]. 3. Occlusion of the left vertebral artery origin with intermittent reconstitution of the distal cervical and intracranial regions. 4. Additional occlusion of the vertebrobasilar region with atherosclerotic narrowing and multifocal stenosis of the basilar artery and proximal left PCA. Atherosclerotic irregularity with potential occlusion of the more  distal PCA branches bilaterally. 5. Diffuse atherosclerosis with estimated 40% stenosis of the left internal carotid artery origin. 6. 4.4 cm enhancing mass associated with the posteroinferior aspect of the superficial right parotid gland that may represent a primary salivary gland neoplasm. Consider FNA for pathologic correlation.   MACRO: Virgilio Herr discussed the significance and urgency of this critical finding by telephone with  Dr. Lewis On 4/5/2024 at 12:02 pm. (**-RCF-**) Findings:  See findings.     Signed by: Virgilio Herr 4/5/2024 12:20 PM Dictation workstation:   [id number]  .      Assessment/Plan   Principal Problem:    CVA (cerebrovascular accident due to intracerebral hemorrhage) (CMS/HCC)    Stroke like symptoms  - TIA vs acute stroke; low suspicion for seizure  - Suspect subacute stroke in right MCA territory (incidental finding)  - Neuro imaging reviewed with DR. Littlejohn    Recommend:    Complete stroke work up with MRI brain and echo  Continue aspirin and statin  Cardiac telemetry  Neuro checks every 4 hours  PT/OT    Case/plan discussed and pt seen with DR. Littlejohn.  Neurology will follow.       I spent 55 minutes in the professional and overall care of this patient.      Judi Hall, APRN-CNP

## 2024-04-06 ENCOUNTER — APPOINTMENT (OUTPATIENT)
Dept: CARDIOLOGY | Facility: HOSPITAL | Age: 84
DRG: 065 | End: 2024-04-06
Payer: MEDICARE

## 2024-04-06 VITALS
TEMPERATURE: 97.3 F | SYSTOLIC BLOOD PRESSURE: 154 MMHG | HEART RATE: 100 BPM | BODY MASS INDEX: 18.94 KG/M2 | WEIGHT: 132.28 LBS | HEIGHT: 70 IN | OXYGEN SATURATION: 98 % | RESPIRATION RATE: 16 BRPM | DIASTOLIC BLOOD PRESSURE: 81 MMHG

## 2024-04-06 LAB
ALBUMIN SERPL BCP-MCNC: 3 G/DL (ref 3.4–5)
ANION GAP SERPL CALC-SCNC: 14 MMOL/L (ref 10–20)
BASOPHILS # BLD AUTO: 0.03 X10*3/UL (ref 0–0.1)
BASOPHILS NFR BLD AUTO: 0.6 %
BUN SERPL-MCNC: 26 MG/DL (ref 6–23)
CALCIUM SERPL-MCNC: 8.2 MG/DL (ref 8.6–10.3)
CHLORIDE SERPL-SCNC: 109 MMOL/L (ref 98–107)
CO2 SERPL-SCNC: 22 MMOL/L (ref 21–32)
CREAT SERPL-MCNC: 1.75 MG/DL (ref 0.5–1.3)
EGFRCR SERPLBLD CKD-EPI 2021: 38 ML/MIN/1.73M*2
EOSINOPHIL # BLD AUTO: 0.14 X10*3/UL (ref 0–0.4)
EOSINOPHIL NFR BLD AUTO: 2.6 %
ERYTHROCYTE [DISTWIDTH] IN BLOOD BY AUTOMATED COUNT: 16.8 % (ref 11.5–14.5)
GLUCOSE SERPL-MCNC: 74 MG/DL (ref 74–99)
HCT VFR BLD AUTO: 35.2 % (ref 41–52)
HGB BLD-MCNC: 10.3 G/DL (ref 13.5–17.5)
IMM GRANULOCYTES # BLD AUTO: 0.02 X10*3/UL (ref 0–0.5)
IMM GRANULOCYTES NFR BLD AUTO: 0.4 % (ref 0–0.9)
LYMPHOCYTES # BLD AUTO: 1.45 X10*3/UL (ref 0.8–3)
LYMPHOCYTES NFR BLD AUTO: 26.8 %
MAGNESIUM SERPL-MCNC: 1.79 MG/DL (ref 1.6–2.4)
MCH RBC QN AUTO: 24.6 PG (ref 26–34)
MCHC RBC AUTO-ENTMCNC: 29.3 G/DL (ref 32–36)
MCV RBC AUTO: 84 FL (ref 80–100)
MONOCYTES # BLD AUTO: 0.35 X10*3/UL (ref 0.05–0.8)
MONOCYTES NFR BLD AUTO: 6.5 %
NEUTROPHILS # BLD AUTO: 3.43 X10*3/UL (ref 1.6–5.5)
NEUTROPHILS NFR BLD AUTO: 63.1 %
NRBC BLD-RTO: 0 /100 WBCS (ref 0–0)
PHOSPHATE SERPL-MCNC: 3.5 MG/DL (ref 2.5–4.9)
PLATELET # BLD AUTO: 166 X10*3/UL (ref 150–450)
POTASSIUM SERPL-SCNC: 3.8 MMOL/L (ref 3.5–5.3)
RBC # BLD AUTO: 4.18 X10*6/UL (ref 4.5–5.9)
SODIUM SERPL-SCNC: 141 MMOL/L (ref 136–145)
WBC # BLD AUTO: 5.4 X10*3/UL (ref 4.4–11.3)

## 2024-04-06 PROCEDURE — 85025 COMPLETE CBC W/AUTO DIFF WBC: CPT

## 2024-04-06 PROCEDURE — 99231 SBSQ HOSP IP/OBS SF/LOW 25: CPT | Performed by: NURSE PRACTITIONER

## 2024-04-06 PROCEDURE — 2500000001 HC RX 250 WO HCPCS SELF ADMINISTERED DRUGS (ALT 637 FOR MEDICARE OP)

## 2024-04-06 PROCEDURE — 83735 ASSAY OF MAGNESIUM: CPT

## 2024-04-06 PROCEDURE — 36415 COLL VENOUS BLD VENIPUNCTURE: CPT

## 2024-04-06 PROCEDURE — 80069 RENAL FUNCTION PANEL: CPT

## 2024-04-06 PROCEDURE — 93005 ELECTROCARDIOGRAM TRACING: CPT

## 2024-04-06 PROCEDURE — 2500000001 HC RX 250 WO HCPCS SELF ADMINISTERED DRUGS (ALT 637 FOR MEDICARE OP): Performed by: NURSE PRACTITIONER

## 2024-04-06 PROCEDURE — 2500000004 HC RX 250 GENERAL PHARMACY W/ HCPCS (ALT 636 FOR OP/ED)

## 2024-04-06 RX ORDER — DIVALPROEX SODIUM 125 MG/1
250 CAPSULE, COATED PELLETS ORAL EVERY 12 HOURS SCHEDULED
Status: DISCONTINUED | OUTPATIENT
Start: 2024-04-06 | End: 2024-04-06 | Stop reason: HOSPADM

## 2024-04-06 RX ORDER — DIVALPROEX SODIUM 125 MG/1
250 CAPSULE, COATED PELLETS ORAL 2 TIMES DAILY
Qty: 120 CAPSULE | Refills: 0 | Status: SHIPPED | OUTPATIENT
Start: 2024-04-06 | End: 2024-05-06

## 2024-04-06 RX ADMIN — DIVALPROEX SODIUM 250 MG: 125 CAPSULE, COATED PELLETS ORAL at 11:29

## 2024-04-06 RX ADMIN — ASPIRIN 81 MG 81 MG: 81 TABLET ORAL at 08:42

## 2024-04-06 RX ADMIN — POLYETHYLENE GLYCOL 3350 17 G: 17 POWDER, FOR SOLUTION ORAL at 08:41

## 2024-04-06 SDOH — ECONOMIC STABILITY: FOOD INSECURITY: WITHIN THE PAST 12 MONTHS, YOU WORRIED THAT YOUR FOOD WOULD RUN OUT BEFORE YOU GOT MONEY TO BUY MORE.: NEVER TRUE

## 2024-04-06 SDOH — ECONOMIC STABILITY: INCOME INSECURITY: IN THE PAST 12 MONTHS, HAS THE ELECTRIC, GAS, OIL, OR WATER COMPANY THREATENED TO SHUT OFF SERVICE IN YOUR HOME?: NO

## 2024-04-06 SDOH — ECONOMIC STABILITY: FOOD INSECURITY: WITHIN THE PAST 12 MONTHS, THE FOOD YOU BOUGHT JUST DIDN'T LAST AND YOU DIDN'T HAVE MONEY TO GET MORE.: NEVER TRUE

## 2024-04-06 ASSESSMENT — COGNITIVE AND FUNCTIONAL STATUS - GENERAL
DAILY ACTIVITIY SCORE: 12
TURNING FROM BACK TO SIDE WHILE IN FLAT BAD: A LOT
EATING MEALS: A LITTLE
CLIMB 3 TO 5 STEPS WITH RAILING: A LOT
PERSONAL GROOMING: A LOT
TOILETING: TOTAL
DRESSING REGULAR LOWER BODY CLOTHING: A LOT
STANDING UP FROM CHAIR USING ARMS: A LOT
DRESSING REGULAR UPPER BODY CLOTHING: A LOT
MOVING TO AND FROM BED TO CHAIR: A LOT
MOVING FROM LYING ON BACK TO SITTING ON SIDE OF FLAT BED WITH BEDRAILS: A LITTLE
WALKING IN HOSPITAL ROOM: A LOT
MOBILITY SCORE: 13
HELP NEEDED FOR BATHING: A LOT

## 2024-04-06 ASSESSMENT — PAIN SCALES - GENERAL: PAINLEVEL_OUTOF10: 0 - NO PAIN

## 2024-04-06 NOTE — PROGRESS NOTES
"Cuong Cheek is a 84 y.o. male on day 1 of admission presenting with CVA (cerebrovascular accident due to intracerebral hemorrhage) (CMS/[facility]).      Subjective   Pt has no stroke like symptoms today.    I spoke to patient's POA, Arash, regarding MRI results and the need to start an anti-epileptic medication. Keppra was preferred by this neurology team, however, Arash states that patient has problems with mood and irritability already and requests that a different medication be initiated. We discussed Depakote and its side effects and he was fine with starting this medication. Pt also has a long history of alcohol abuse. His LFTs are normal.        Objective     Last Recorded Vitals  Blood pressure (!) 156/96, pulse 68, temperature 36 °C (96.8 °F), temperature source Temporal, resp. rate 16, height 1.778 m (5' 10\"), weight 60 kg (132 lb 4.4 oz), SpO2 99 %.    Physical Exam  Neurological Exam  Mental Status: Awake and alert. Oriented to person, place and time. Speech was fluent to history. Naming, repetition and comprehension  were intact.   CN: (CN2)- Left visual field deficit. (CN 2,3) PERRL. (CN 3,4,6) EOMI (CN 5)Facial sensation was intact to light touch bilaterally. (CN 7)Facial expression was symmetric (CN 8)- hearing diminished. (CN 12) Tongue protruded midline.   Motor: Normal muscle bulk and tone. Strength (confrontation testing) was (R/L) 5/5 shoulder abduction, elbow flexion/extension,  strenght, hip flexion, knee flexion/extension, ankle dorsi- and plantar flexion. There were no abnormal movements. Limited lift on left side 2/2 shoulder injury. Sensory: Intact to light touch in all 4 extremities. Coordination (cerebellar function): Finger to nose intact with no dysmetria.   Gait: deferred    Relevant Results  Scheduled medications  aspirin, 81 mg, oral, Daily  atorvastatin, 80 mg, oral, Nightly  divalproex sprinkle, 250 mg, oral, q12h ESTEE  enoxaparin, 30 mg, subcutaneous, q24h  polyethylene glycol, " 17 g, oral, Daily      Continuous medications     PRN medications  PRN medications: docusate sodium  Results for orders placed or performed during the hospital encounter of 04/05/24 (from the past 96 hour(s))   CBC and Auto Differential   Result Value Ref Range    WBC 7.8 4.4 - 11.3 x10*3/uL    nRBC 0.0 0.0 - 0.0 /100 WBCs    RBC 4.59 4.50 - 5.90 x10*6/uL    Hemoglobin 11.3 (L) 13.5 - 17.5 g/dL    Hematocrit 37.6 (L) 41.0 - 52.0 %    MCV 82 80 - 100 fL    MCH 24.6 (L) 26.0 - 34.0 pg    MCHC 30.1 (L) 32.0 - 36.0 g/dL    RDW 16.7 (H) 11.5 - 14.5 %    Platelets 201 150 - 450 x10*3/uL    Neutrophils % 75.3 40.0 - 80.0 %    Immature Granulocytes %, Automated 0.3 0.0 - 0.9 %    Lymphocytes % 16.4 13.0 - 44.0 %    Monocytes % 5.0 2.0 - 10.0 %    Eosinophils % 2.6 0.0 - 6.0 %    Basophils % 0.4 0.0 - 2.0 %    Neutrophils Absolute 5.88 (H) 1.60 - 5.50 x10*3/uL    Immature Granulocytes Absolute, Automated 0.02 0.00 - 0.50 x10*3/uL    Lymphocytes Absolute 1.28 0.80 - 3.00 x10*3/uL    Monocytes Absolute 0.39 0.05 - 0.80 x10*3/uL    Eosinophils Absolute 0.20 0.00 - 0.40 x10*3/uL    Basophils Absolute 0.03 0.00 - 0.10 x10*3/uL   Comprehensive metabolic panel   Result Value Ref Range    Glucose 97 74 - 99 mg/dL    Sodium 140 136 - 145 mmol/L    Potassium 4.0 3.5 - 5.3 mmol/L    Chloride 105 98 - 107 mmol/L    Bicarbonate 25 21 - 32 mmol/L    Anion Gap 14 10 - 20 mmol/L    Urea Nitrogen 26 (H) 6 - 23 mg/dL    Creatinine 2.02 (H) 0.50 - 1.30 mg/dL    eGFR 32 (L) >60 mL/min/1.73m*2    Calcium 8.5 (L) 8.6 - 10.3 mg/dL    Albumin 3.5 3.4 - 5.0 g/dL    Alkaline Phosphatase 88 33 - 136 U/L    Total Protein 6.0 (L) 6.4 - 8.2 g/dL    AST 12 9 - 39 U/L    Bilirubin, Total 0.6 0.0 - 1.2 mg/dL    ALT 8 (L) 10 - 52 U/L   Troponin I, High Sensitivity   Result Value Ref Range    Troponin I, High Sensitivity 20 0 - 20 ng/L   Protime-INR   Result Value Ref Range    Protime 12.9 (H) 9.8 - 12.8 seconds    INR 1.1 0.9 - 1.1   APTT   Result Value Ref  Range    aPTT 34 27 - 38 seconds   POCT GLUCOSE   Result Value Ref Range    POCT Glucose 85 74 - 99 mg/dL   ECG 12 lead   Result Value Ref Range    Ventricular Rate 77 BPM    Atrial Rate 77 BPM    VT Interval 178 ms    QRS Duration 94 ms    QT Interval 350 ms    QTC Calculation(Bazett) 396 ms    P Axis 63 degrees    R Axis 62 degrees    T Axis 254 degrees    QRS Count 13 beats    Q Onset 221 ms    P Onset 132 ms    P Offset 178 ms    T Offset 396 ms    QTC Fredericia 380 ms   ECG 12 lead   Result Value Ref Range    Ventricular Rate 69 BPM    Atrial Rate 69 BPM    VT Interval 152 ms    QRS Duration 96 ms    QT Interval 432 ms    QTC Calculation(Bazett) 462 ms    P Axis 46 degrees    R Axis 96 degrees    T Axis 243 degrees    QRS Count 11 beats    Q Onset 221 ms    P Onset 145 ms    P Offset 178 ms    T Offset 437 ms    QTC Fredericia 452 ms   CBC and Auto Differential   Result Value Ref Range    WBC 5.4 4.4 - 11.3 x10*3/uL    nRBC 0.0 0.0 - 0.0 /100 WBCs    RBC 4.18 (L) 4.50 - 5.90 x10*6/uL    Hemoglobin 10.3 (L) 13.5 - 17.5 g/dL    Hematocrit 35.2 (L) 41.0 - 52.0 %    MCV 84 80 - 100 fL    MCH 24.6 (L) 26.0 - 34.0 pg    MCHC 29.3 (L) 32.0 - 36.0 g/dL    RDW 16.8 (H) 11.5 - 14.5 %    Platelets 166 150 - 450 x10*3/uL    Neutrophils % 63.1 40.0 - 80.0 %    Immature Granulocytes %, Automated 0.4 0.0 - 0.9 %    Lymphocytes % 26.8 13.0 - 44.0 %    Monocytes % 6.5 2.0 - 10.0 %    Eosinophils % 2.6 0.0 - 6.0 %    Basophils % 0.6 0.0 - 2.0 %    Neutrophils Absolute 3.43 1.60 - 5.50 x10*3/uL    Immature Granulocytes Absolute, Automated 0.02 0.00 - 0.50 x10*3/uL    Lymphocytes Absolute 1.45 0.80 - 3.00 x10*3/uL    Monocytes Absolute 0.35 0.05 - 0.80 x10*3/uL    Eosinophils Absolute 0.14 0.00 - 0.40 x10*3/uL    Basophils Absolute 0.03 0.00 - 0.10 x10*3/uL   Renal Function Panel   Result Value Ref Range    Glucose 74 74 - 99 mg/dL    Sodium 141 136 - 145 mmol/L    Potassium 3.8 3.5 - 5.3 mmol/L    Chloride 109 (H) 98 - 107  mmol/L    Bicarbonate 22 21 - 32 mmol/L    Anion Gap 14 10 - 20 mmol/L    Urea Nitrogen 26 (H) 6 - 23 mg/dL    Creatinine 1.75 (H) 0.50 - 1.30 mg/dL    eGFR 38 (L) >60 mL/min/1.73m*2    Calcium 8.2 (L) 8.6 - 10.3 mg/dL    Phosphorus 3.5 2.5 - 4.9 mg/dL    Albumin 3.0 (L) 3.4 - 5.0 g/dL   Magnesium   Result Value Ref Range    Magnesium 1.79 1.60 - 2.40 mg/dL    MR brain w and wo IV contrast    Result Date: 4/5/2024  Interpreted By:  Luis Miguel Rosa, STUDY: MR BRAIN W AND WO IV CONTRAST;  4/5/2024 6:02 pm   INDICATION: Signs/Symptoms:Concerns for CVA, TIA versus stroke.   COMPARISON: 04/05/2024 head CT   ACCESSION NUMBER(S): [id number]   ORDERING CLINICIAN: RENNY MENDENHALL   TECHNIQUE: Axial T2, FLAIR, DWI, gradient echo T2 and sagittal and coronal T1 weighted images of brain were acquired. Post contrast T1 weighted images were acquired after administration of  gadolinium based intravenous contrast.   FINDINGS: Evaluation is at least minimally limited due to patient motion. Gyriform restricted diffusion along the lateral margin of the right occipital lobe in the region of encephalomalacia may be due to minimal acute or subacute on chronic ischemia, seizure activity, and/or artifact for instance given apparent pre-existing encephalomalacia in this region. No hemorrhagic transformation. Focal additional encephalomalacia along the lateral margin of the right frontal lobe extending into the operculum. No acute intracranial hemorrhage mass effect or midline shift. There is loss of the typical flow void of the right petrous and cavernous internal carotid artery due to slow flow or thrombosis. Likewise there is decreased visualization of flow void of the distal left vertebral artery and portions of the proximal basilar artery which could be due to slow flow or thrombosis. Moderate global parenchymal volume loss with consequent ex vacuo prominence of the ventricular system sulci and cisterns. Patient is status post left cataract  extraction. Visualized paranasal sinuses and mastoid air cells are clear. There is a 1.4 by 1.0 cm T2 hyperintense mass or masslike focus just medial to the distal right cervical internal carotid artery in the region of the distal right carotid sheath/lateral right retropharyngeal region on image 27 series 8 incidentally noted and incompletely evaluated on the current examination. Additional mass or masslike lesion with apparent enhancement along the posterior margin of the superficial lobe of the right parotid gland measures up to 2.4 by 2.4 by 3.9 cm incompletely evaluated on the current exam.       Gyriform restricted diffusion along the lateral margin of the right occipital lobe in the region of encephalomalacia due to acute or subacute on chronic ischemia, seizure activity, and/or artifact.   Reduced or absent visualization of flow voids within the right petrous and cavernous internal carotid arteries and distal left vertebral artery and proximal basilar artery due to slow flow or thrombosis. Please correlate clinically and advise CT angiography and/or MR angiography for further evaluation if clinically indicated.   T2 hyperintense mass or masslike lesion noted on limited examination of the upper neck in the region of the rostral carotid sheath on the right side possibly reflecting schwannoma for instance although adenopathy or artifact are also possible. Large mass of the right parotid gland incidentally noted and incompletely evaluated on the current examination. Right parotid mass lesion was noted on CT 04/05/2024. Further evaluation with dedicated MR imaging of the neck for instance at the time of follow-up examination could be considered if clinically necessary.   MACRO: Critical Finding:  See findings. Notification was initiated on 4/5/2024 at 6:27 pm by  Luis Miguel Rosa.  (**-OCF-**)   Signed by: Luis Miguel Rosa 4/5/2024 6:27 PM Dictation workstation:   [id number]    ECG 12 lead    Result Date: 4/5/2024  Normal  sinus rhythm Rightward axis ST & T wave abnormality, consider inferior ischemia Prolonged QT Abnormal ECG When compared with ECG of 05-APR-2024 11:37, (unconfirmed) QT has lengthened    ECG 12 lead    Result Date: 4/5/2024  Normal sinus rhythm Septal infarct , age undetermined ST & T wave abnormality, consider inferior ischemia Abnormal ECG No previous ECGs available    CT brain attack head wo IV contrast    Result Date: 4/5/2024  Interpreted By:  Mirza Gregorio, STUDY: CT BRAIN ATTACK HEAD WO IV CONTRAST;  4/5/2024 11:26 am   INDICATION: Signs/Symptoms:Stroke Evaluation, left visual field deficit, left leg weakness.   COMPARISON: No prior examination available for comparison. If a prior examination becomes available, this examination will be compared to the prior study. Addendum report will be issued.   ACCESSION NUMBER(S): [id number]   ORDERING CLINICIAN: NOEL VALDEZ   TECHNIQUE: Noncontrast axial CT scan of head was performed. Angled reformats in brain and bone windows were generated. The images were reviewed in bone, brain, blood and soft tissue windows.   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 Follow-up ultrasound recommended.   Cerebral atrophy with concordant ventriculomegaly, age-appropriate.   There is low-attenuation of the cortical and subcortical white matter of the right occipital and parietal regions and right posterior trigone. Density value would suggest subacute or chronic.  MRI is best suited for determining acuity. No significant mass effect. No hemorrhage identified.   Small old lacunar infarct of the deep white matter right side axial image 18 measuring 4 mm.       There is no acute hemorrhage. Other findings appear chronic. Area of edema of the right posterior parietal and trigone region has density value would suggest subacute possibly chronic process, best followed up with MRI examination.   Right parotid gland mass   Please refer to the CT a report.   Results sent to the  patient's clinician by secure chat at 12:38 p.m. on 04/05/2024. Response from clinician received.   MACRO: None   Signed by: Mirza Gregorio 4/5/2024 12:39 PM Dictation workstation:   [id number]    CT brain attack angio head and neck W and WO IV contrast    Result Date: 4/5/2024  Interpreted By:  Virgilio Herr, STUDY: CT BRAIN ATTACK ANGIO HEAD AND NECK W AND WO IV CONTRAST performed 4/5/2024   INDICATION: Signs/Symptoms:Stroke Evaluation, left visual field deficit, left leg weakness   COMPARISON: Concurrent CT head.   ACCESSION NUMBER(S): [id number]   ORDERING CLINICIAN: NOEL VALDEZ   TECHNIQUE: Axial CTA imaging was obtained through the head and neck following the administration of 70 mL Omnipaque 350 intravenous contrast. Coronal, sagittal, MIP, and 3D reconstructions were obtained. 3D reconstructions were rendered using a separate 3D workstation.   FINDINGS: NECK:   There is a 4.4 x 2.4 x 2.4 cm enhancing mass associated with the superficial aspect of the right parotid gland along the posterior and inferior aspect (series 508, image 107, series 506, image 443). Otherwise no definite cervical lymphadenopathy. Patent airway. The left parotid gland and bilateral submandibular glands are unremarkable. Thyroid gland is normal. No acute osseous abnormality of the cervical spine. Mild emphysema. Fluid/debris within the esophagus.   VASCULAR FINDINGS:   Aorta and subclavian arteries:Normal three vessel aortic arch configuration.Moderate atherosclerotic calcification of the aortic arch with involvement of the great vessel origins. Subclavian arteries are patent without flow significant stenosis.   Common carotid arteries: Patent bilaterally without flow significant stenosis. There is diffuse noncalcified atheromatous plaque involvement throughout the common carotid arteries with mild narrowing on the right.   External carotid arteries: Patent bilaterally.   Internal carotid arteries: The right internal carotid artery  origin is occluded at its origin. There is collateralized filling/partial opacification of the distal right intracranial internal carotid artery segments extending proximally to the ophthalmic artery. Atherosclerotic involvement of the left carotid bifurcation and left internal carotid artery origin with estimated 40% stenosis by NASCET criteria. There is tortuosity and mild fusiform dilatation of the mid to distal cervical left internal carotid artery. Additional atherosclerotic irregularity of the parasellar intracranial left internal carotid artery segments with mild luminal narrowing but no flow significant stenosis.   Anterior cerebral arteries: Poorly opacified, likely diminutive right A1 segment. The left A1 segment is well opacified. The bilateral A2 segments appear well opacified.   Middle cerebral arteries: There is significantly reduced enhancement within the right MCA diffusely relative to the left. The left MCA appears patent without flow significant proximal stenosis.   Vertebral arteries: Suggested severe stenosis of the right vertebral artery origin secondary to calcified plaque components (series 506, image 189). There is additional mild atherosclerotic narrowing and irregularity of the right vertebral artery with patent appearance. The left vertebral artery is occluded at its origin with partial reconstitution beginning at the C5 vertebral body level. There is additional irregularity and narrowing throughout the remainder of the left cervical segments. There is suggestion of additional occlusion versus severe stenosis of the proximal left V4 segment with thready intermittent enhancement of the remainder of the left V4 segment.   Basilar artery: Short segment occlusion at the inferior vertebrobasilar junction with additional irregular enhancement of the remainder of the basilar artery.   Posterior communicating arteries: Visualized bilaterally.   Posterior cerebral arteries: Severe focal stenosis  involving the left P2 segment (series 503, image 213). The bilateral P1 and proximal P2 segments appear otherwise patent. There is additional peripheral atherosclerotic irregularity and likely occlusion of the more distal PCA branches.       1. Occlusion of the right internal carotid artery origin with mild reconstitution/collateralized filling of the distal intracranial internal carotid artery segments extending to the ophthalmic artery. 2. Significantly reduced enhancement/filling of the right MCA relative to the left with faint generalized enhancement, likely reflecting aforementioned right ICA occlusion and collateralized filling via the [id number]. 3. Occlusion of the left vertebral artery origin with intermittent reconstitution of the distal cervical and intracranial regions. 4. Additional occlusion of the vertebrobasilar region with atherosclerotic narrowing and multifocal stenosis of the basilar artery and proximal left PCA. Atherosclerotic irregularity with potential occlusion of the more distal PCA branches bilaterally. 5. Diffuse atherosclerosis with estimated 40% stenosis of the left internal carotid artery origin. 6. 4.4 cm enhancing mass associated with the posteroinferior aspect of the superficial right parotid gland that may represent a primary salivary gland neoplasm. Consider FNA for pathologic correlation.   MACRO: Virgilio Herr discussed the significance and urgency of this critical finding by telephone with  Dr. Lewis On 4/5/2024 at 12:02 pm. (**-RCF-**) Findings:  See findings.     Signed by: Virgilio Herr 4/5/2024 12:20 PM Dictation workstation:   [id number]     Assessment/Plan      Principal Problem:    CVA (cerebrovascular accident due to intracerebral hemorrhage) (CMS/[facility])    Subacute infarct, seizure- MRI brain reviewed with DR. Littlejohn (old stroke, seizure changes)  Right ICA occlusion    Recommend starting Depakote 250mg BID; can uptitrate if any further seizures.  Continue  aspirin and statin.  Seizure and fall precautions.  Neuro checks every 4 hours while inpatient.  PT/OT/ST evaluations  Follow up with Dr. Lebron in one month.    Case/plan discussed and pt seen with Dr. Littlejohn.  No further needs from neurology; okay for transfer or discharge as per primary team. Please contact if condition changes for re-eval.           I spent 30 minutes in the professional and overall care of this patient.      Judi Hall, ALIYAH-CNP

## 2024-04-06 NOTE — CARE PLAN
The patient's goals for the shift include  remaining comfortable throughout shift.    The clinical goals for the shift include see POC      Problem: Pain  Goal: My pain/discomfort is manageable  Outcome: Progressing     Problem: Safety  Goal: I will remain free of falls  Outcome: Progressing     Problem: General Stroke  Goal: Establish a mutual long term goal with patient by discharge  Outcome: Progressing  Goal: Demonstrate improvement in neurological exam throughout the shift  Outcome: Progressing  Goal: Maintain BP within ordered limits throughout shift  Outcome: Progressing  Goal: Participate in treatment (ie., meds, therapy) throughout shift  Outcome: Progressing  Goal: No symptoms of aspiration throughout shift  Outcome: Progressing  Goal: No symptoms of hemorrhage throughout shift  Outcome: Progressing  Goal: Tolerate enteral feeding throughout shift  Outcome: Progressing  Goal: Decreased nausea/vomiting throughout shift  Outcome: Progressing  Goal: Controlled blood glucose throughout shift  Outcome: Progressing  Goal: Out of bed three times today  Outcome: Progressing

## 2024-04-06 NOTE — ED PROVIDER NOTES
HPI   Chief Complaint   Patient presents with    Stroke       Patient is a 84-year-old male with hypertension, hyperlipidemia, heart failure, BPH presenting to the emergency department for stroke symptoms.  Patient had bent over to  a dropped object when he fell forward, striking his head.  Staff at his nursing facility came to help him and noticed that he was having left-sided facial droop, slurred speech, and weakness in his left arm and leg.  They called 911.  On arrival to the scene, EMS noted that he also had left-sided neglect.  Brain attack was called from the field.  On arrival to the emergency department, patient did have improvement in his symptoms.  He is still having a mild left lower facial droop and left leg drift.  He is taken to CT scanner promptly.  On arrival back to the examination room, patient's symptoms have continued to improve significantly.  He no longer has weakness in his left leg.  However, he does not know the month.  He denies any symptoms such as chest pain, shortness of breath, cough, fevers, nausea vomiting, abdominal pain, numbness, dysuria.  He denied having any symptoms prior to the fall, stating that he had lost his balance.  Patient has confirmed DNR comfort care form from his nursing facility.      History provided by:  Patient, relative and EMS personnel                      Estuardo Coma Scale Score: 14         NIH Stroke Scale: 1             Patient History   No past medical history on file.  No past surgical history on file.  No family history on file.  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   Constitutional:       General: He is not in acute distress.     Appearance: He is well-developed.   HENT:      Head: Normocephalic and atraumatic.      Right Ear: External ear normal.      Left Ear: External ear normal.      Nose: Nose normal.   Eyes:      General: No scleral icterus.     Conjunctiva/sclera: Conjunctivae normal.      Pupils: Pupils are equal, round, and reactive to light.   Cardiovascular:      Rate and Rhythm: Normal rate and regular rhythm.      Heart sounds: No murmur heard.  Pulmonary:      Effort: Pulmonary effort is normal. No respiratory distress.      Breath sounds: Normal breath sounds.   Abdominal:      Palpations: Abdomen is soft.      Tenderness: There is no abdominal tenderness.   Musculoskeletal:         General: No swelling.      Cervical back: Neck supple. No rigidity.   Skin:     General: Skin is warm and dry.   Neurological:      Mental Status: He is alert. He is disoriented.      Sensory: No sensory deficit.      Motor: No weakness.      Coordination: Coordination normal.      Comments: Wrong month.  Mild left lower facial droop that corrects with activation of the facial muscles.   Psychiatric:         Mood and Affect: Mood normal.         ED Course & MDM   Diagnoses as of 04/06/24 0031   Stroke-like episode   CVA (cerebrovascular accident due to intracerebral hemorrhage) (CMS/[facility])       Medical Decision Making  Patient is an 84-year-old male presenting to the emergency department for left-sided stroke symptoms.  Patient is afebrile hemodynamic stable arrival.  He is awake and alert, no acute distress, nontoxic in appearance.  Patient had rapid improvement of his symptoms from scene to the emergency department,  with only mild left-sided facial droop, dysarthria, and left lower extremity drift on arrival.  Left-sided neglect and left arm weakness have appeared to resolve.  Although VAN negative, patient has a potentially debilitating deficit.  CT head without contrast as well as CTA head and neck are obtained.  Patient has been placed in a c-collar by EMS given that he had fallen and had a head injury.  On arrival back from the CT scanner, patient's symptoms have improved dramatically, with a repeat NIH SS of 1 for wrong month.  CT head negative for acute intracranial hemorrhage.  Area of edema in the right posterior parietal and trigone area suggests a possible subacute versus chronic process.  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  Additional stenosis of the left vertebral artery and PCA.    We discussed the patient's presentation and CT findings with on-call neurologist, Dr. Littlejohn.  She reviewed the images as well.  Given that the patient has had improvement in his symptoms, agrees that tenecteplase is not indicated.  There is a possibility that the abnormality in the right parietal lobe could be a focus of seizure activity causing the patient's symptoms and subsequent improvement.  Patient is DNR comfort care, and will be reasonable if the patient and family decided that he would rather have outpatient follow-up rather than admission to the hospital.  We also discussed possible thrombectomy given his multiple occlusions and stenosis.  However, Dr. Littlejohn reviewed the images and these are chronic in nature and he is not a thrombectomy candidate.    Patient's blood work is negative for leukocytosis.  He does have an anemia with a hemoglobin of 11.3.  No prior lab work to compare this to.  Chemistry panel also shows an elevated creatinine at 2.02.  No prior lab work to compare this to, unknown if this is QUYNH or CKD.  His EKG did show borderline ST  elevations in the anterior leads.  However, patient denies any chest pain, shortness of breath, dizziness, nausea or vomiting, or any other symptoms.  His troponin level returned as normal.  Low suspicion for acute coronary syndrome given the negative troponin level and that the patient is asymptomatic.  We do not have any prior EKGs for comparison.    Admission was offered to the patient as opposed to outpatient follow-up.  Patient and his family discussed this and decided that they would like to be admitted for further evaluation.  This was discussed with the teaching service as well as with Dr. Littlejohn.  Patient is admitted to the medicine service.    Cuong Alegre DO, PGY 3  Emergency Medicine Resident    Amount and/or Complexity of Data Reviewed  Labs: ordered.  Radiology: ordered.  ECG/medicine tests: ordered.        Procedure  Procedures     Cuong Alegre DO  Resident  04/06/24 0045

## 2024-04-06 NOTE — HOSPITAL COURSE
"Cuong Cheek is a 84 y.o. male CAD (s/p BMS to LAD in 2008), HTN, HLD, EtOH use disorder, tobacco use disorder, CKD, PAD, Gout, BPH s/p TURP presenting from his nursing facility after falling out of his wheelchair earlier today.  Patient remembers the event and says that he was reaching down to pick something up off the ground when he slid forward out of his chair and hit his head on a piece of furniture.  Nursing home staff then came to evaluate the patient and noticed that he was having left-sided arm and leg weakness as well as left facial droop and \"garbled speech\".  EMS was called and on their assessment he was having similar deficits specifically the left leg weakness.  He was brought to the emergency department and a brain attack was called and neurology was called.  Initial NIH at that time was 5.  CT head performed and revealed no acute hemorrhage however did show chronic findings (see below).  CT angio head and neck with and without contrast revealed multiple occlusions and other findings that based on Dr. Littlejohn's assessment appeared to be chronic.  He was not given TNK.  Upon returning from imaging, his NIH stroke scale was a 1, and his weakness, garbled speech, and facial droop had all resolved.  Patient says that he remembers the entire incident and had no precipitating symptoms such as chest pain, shortness of breath, lightheadedness, dizziness, visual changes, nausea, vomiting.  At present he denies having any pain other than the discomfort of sitting in the hospital bed.  His daughter is at bedside and reports that he is at his baseline mentally and physically in her assessment.  Patient to be admitted for further workup of stroke versus TIA versus seizure activity with neurology on consult.     ED course  Initial vitals: Afebrile 36.4, HR 86, RR 16, /68, 98% on room air  Labs: CMP significant for BUN/creatinine of 26/2.02 (appears to be near baseline), normal electrolytes.  CBC reveals H&H " 11.3/37.6 (baseline), no leukocytosis or thrombocytopenia.  PT/INR 1.1/12.9 respectively.  Imaging: CT brain attack head revealed no acute hemorrhage. Other findings are chronic- afua of  edema of the right posterior parietal and trigone region has density value would suggest subacute possibly chronic process, Right parotid gland mass. CT angio brain attack revealed chronic changes (see report below)  EKG: Normal sinus rhythm no acute injury pattern  ED interventions: Brain attack called and neurology consulted.    Patient admitted to general medicine service for stroke rule out, epilepsy evaluation.  MRI was obtained during inpatient stay.  MRI was indeterminate, with suggestions of acute versus subacute versus chronic findings.    Dr. Littlejohn neurology, believes the symptom presentation was consistent with seizure activity.  Recommended discharge on 250 mg twice daily Depakote.  Recommended 1 month outpatient follow-up with Dr. Lebron.  Patient back to baseline.  Patient medically stable for discharge at this time.

## 2024-04-06 NOTE — PROGRESS NOTES
Speech-Language Pathology    SLP Adult Inpatient Speech-Language Pathology Clinical Swallow Evaluation    Patient Name: Cuong Cheek  MRN: [id number]  Today's Date: 4/6/2024   Time Calculation  Start Time: 1858  Stop Time: 1907  Time Calculation (min): 9 min         Current Problem:   1. CVA (cerebrovascular accident due to intracerebral hemorrhage) (CMS/HCC)        2. Stroke-like episode  Transthoracic Echo (TTE) Complete    Transthoracic Echo (TTE) Complete      3. Ac isch multi vasc territories stroke (CMS/HCC)  Transthoracic Echo (TTE) Complete    Transthoracic Echo (TTE) Complete        Recommendations:  Diet: Regular and Thin 0  Medication Administration: Crushed in pureed carrier  Strategies: Sit upright in 90 degree position&maintain upright posture during/after eating for 20-30 mins&slow rate&regular and diligent oral care&assistance/supervision with PO as needed & small sips/bites&alternate between bites and sips&refrain from speaking during oral phase.   ST is not indicated at this time. D/C ST this date. Please reconsult ST should pt present with S/S dysphagia.    Assessment:  Consistencies Trialed: Pureed, Regular; Thin Liquids   Oral motor exam: WFL   Oral phase of swallowing was slowed but WFL, with functional oral preparation and clearance of all textures. Of note, pt reported chewing primarily with his front teeth as his lower denture did not fit well.   Pt presented with no apparent S/S pharyngeal dysphagia throughout this evaluation. Of note, SLP unable to conduct the Mozier Swallow Protocol (i.e., 3 oz water challenge) as pt chose to consume two boluses only via straw.   Pt was oriented x4.    Pt is not considered to be at risk for aspiration at this time.   Per the results of this assessment, patient is appropriate to continue PO with recommendations and precautions as noted above.     Subjective   Pt seen at bedside for clinical swallow evaluation. He was assisted into an upright position for PO  "trials. Pt was pleasant and cooperative, although verbose and perseverative. Vocal quality was WFL.        Baseline Assessment:  O2 use: Room air     General Visit Information:  Clinical Swallow Evaluation ordered d/t concern for dysphagia. Current diet level is Regular with thin liquids.  Pt reports no difficulty with swallowing.    History Of Present Illness:       Per EMR, \"Cuong Cheek is a 84 y.o. male CAD (s/p BMS to LAD in 2008), HTN, HLD, EtOH use disorder, tobacco use disorder, CKD, PAD, Gout, BPH s/p TURP presenting from his nursing facility after falling out of his wheelchair earlier today.  Patient remembers the event and says that he was reaching down to pick something up off the ground when he slid forward out of his chair and hit his head on a piece of furniture.  Nursing home staff then came to evaluate the patient and noticed that he was having left-sided arm and leg weakness as well as left facial droop and \"garbled speech\".  EMS was called and on their assessment he was having similar deficits specifically the left leg weakness.  He was brought to the emergency department and a brain attack was called and neurology was called.  Initial NIH at that time was 5.  CT head performed and revealed no acute hemorrhage however did show chronic findings (see below).  CT angio head and neck with and without contrast revealed multiple occlusions and other findings that based on Dr. Littlejohn's assessment appeared to be chronic.  He was not given TNK.  Upon returning from imaging, his NIH stroke scale was a 1, and his weakness, garbled speech, and facial droop had all resolved.  Patient says that he remembers the entire incident and had no precipitating symptoms such as chest pain, shortness of breath, lightheadedness, dizziness, visual changes, nausea, vomiting.  At present he denies having any pain other than the discomfort of sitting in the hospital bed.  His daughter is at bedside and reports that he is at " "his baseline mentally and physically in her assessment.  Patient to be admitted for further workup of stroke versus TIA versus seizure activity with neurology on consult.     ED course  Initial vitals: Afebrile 36.4, HR 86, RR 16, /68, 98% on room air  Labs: CMP significant for BUN/creatinine of 26/2.02 (appears to be near baseline), normal electrolytes.  CBC reveals H&H 11.3/37.6 (baseline), no leukocytosis or thrombocytopenia.  PT/INR 1.1/12.9 respectively.  Imaging: CT brain attack head revealed no acute hemorrhage. Other findings are chronic- afua of  edema of the right posterior parietal and trigone region has density value would suggest subacute possibly chronic process, Right parotid gland mass. CT angio brain attack revealed chronic changes (see report below)  EKG: Normal sinus rhythm no acute injury pattern  ED interventions: Brain attack called and neurology consulted.      Pain:  Rating scale: 0-10   Pt reported pain in his lower anterior gum at the midline that he rated a 4. He rated pain he felt \"on the tailbone of the bottom of his spine\" a 4-5. RN Jen informed.       Treatment: No     Education:   Learner pt; RN   Barriers to Learning None  Method verbal  Education-Topic  SLP provided patient/family education regarding role of SLP, purpose of assessment and clinical impressions/recommendations. Patient verbalized full comprehension, consistent with cognitive status. SLP further coordinated with RN regarding recommendations and precautions per this assessment, with RN verbalizing understanding.  Outcome Pt verbalized understanding.    "

## 2024-04-06 NOTE — PROGRESS NOTES
04/06/24 1155   Discharge Planning   Type of Post Acute Facility Services Long term care   Patient expects to be discharged to: [facility]   Does the patient need discharge transport arranged? Yes   RoundTrip coordination needed? Yes   Has discharge transport been arranged? No   What day is the transport expected? 04/06/24   Patient Choice   Patient / Family choosing to utilize agency / facility established prior to hospitalization Yes     Spoke to patient at bedside, who verified he is a resident at [facility] and will return when medically ready for d/c. Return referral sent to facility and they verified pt is a long term resident and can return today. Will arrange transport and notify them of time once verified.    1220: Updates with [facility], AVS, and discharge summary to facility along with transportation update. Stretcher transport arranged for 2:30pm due to seizure precautions. Spoke to Arash WOOD, to give discharge update. He asked if we could make sure that patient has lunch prior to leaving hospital because it will be too late to get lunch at facility. Message sent to nursing staff to let them know of Shae request.

## 2024-04-06 NOTE — DISCHARGE INSTRUCTIONS
Please follow-up with Dr. Lebron in his neurology office in 1 month.  If you develop any worsening weakness, change in vision, severe headache, inability to walk, or seizure activity please return to closest ED.

## 2024-04-06 NOTE — NURSING NOTE
Pt's neuro exams remained unchanged throughout shift. Pt's HR was high 40s throughout night while asleep. This nurse made providers aware that pt's HR was glenny while sleeping. Pt had a run of vtach at 0320. This nurse contacted providers who ordered an EKG showing sinus glenny. Pt denied any discomfort and vitals remained stable throughout night. Pt is in bed with call light in reach and bed alarm on.

## 2024-04-06 NOTE — ED PROCEDURE NOTE
Procedure  Critical Care    Performed by: Mika Lewis DO  Authorized by: Mika Lewis DO    Critical care provider statement:     Critical care time (minutes):  60    Critical care was necessary to treat or prevent imminent or life-threatening deterioration of the following conditions:  CNS failure or compromise    Critical care was time spent personally by me on the following activities:  Development of treatment plan with patient or surrogate, discussions with consultants, examination of patient, ordering and performing treatments and interventions, ordering and review of radiographic studies and re-evaluation of patient's condition               Mika Lewis DO  04/06/24 0709

## 2024-04-06 NOTE — DISCHARGE SUMMARY
Discharge Diagnosis  CVA (cerebrovascular accident due to intracerebral hemorrhage) (CMS/HCC)    Issues Requiring Follow-Up  Patient neurology follow-up in 1 month with Dr. Lebron.    Discharge Meds     Your medication list        START taking these medications        Instructions Last Dose Given Next Dose Due   divalproex sprinkle 125 mg DR capsule  Commonly known as: Depakote Sprinkle      Take 2 capsules (250 mg) by mouth 2 times a day.              CONTINUE taking these medications        Instructions Last Dose Given Next Dose Due   acetaminophen 325 mg tablet  Commonly known as: Tylenol           allopurinol 100 mg tablet  Commonly known as: Zyloprim           aspirin 81 mg chewable tablet           atorvastatin 80 mg tablet  Commonly known as: Lipitor           Cepacol Sore Throat (chris-men) 15-2.3 mg lozenge  Generic drug: benzocaine-menthoL           docusate sodium 100 mg capsule  Commonly known as: Colace           finasteride 5 mg tablet  Commonly known as: Proscar           FLUoxetine 20 mg capsule  Commonly known as: PROzac           fluticasone 50 mcg/actuation nasal spray  Commonly known as: Flonase           furosemide 40 mg tablet  Commonly known as: Lasix           hydrALAZINE 25 mg tablet  Commonly known as: Apresoline           isosorbide dinitrate 10 mg tablet  Commonly known as: Isordil           lidocaine 5 % ointment  Commonly known as: Xylocaine           loperamide 2 mg tablet  Commonly known as: Imodium A-D           metoprolol succinate XL 25 mg 24 hr tablet  Commonly known as: Toprol-XL           mirtazapine 7.5 mg tablet  Commonly known as: Remeron           nitroglycerin 0.4 mg SL tablet  Commonly known as: Nitrostat           tamsulosin 0.4 mg 24 hr capsule  Commonly known as: Flomax                     Where to Get Your Medications        These medications were sent to [facility], [place] - [address] [facility]  [address] [facility] [address], [facility] [address]       "Phone: [phone]   divalproex sprinkle 125 mg DR capsule         Test Results Pending At Discharge  Pending Labs       No current pending labs.            Hospital Course  Cuong Cheek is a 84 y.o. male CAD (s/p BMS to LAD in 2008), HTN, HLD, EtOH use disorder, tobacco use disorder, CKD, PAD, Gout, BPH s/p TURP presenting from his nursing facility after falling out of his wheelchair earlier today.  Patient remembers the event and says that he was reaching down to pick something up off the ground when he slid forward out of his chair and hit his head on a piece of furniture.  Nursing home staff then came to evaluate the patient and noticed that he was having left-sided arm and leg weakness as well as left facial droop and \"garbled speech\".  EMS was called and on their assessment he was having similar deficits specifically the left leg weakness.  He was brought to the emergency department and a brain attack was called and neurology was called.  Initial NIH at that time was 5.  CT head performed and revealed no acute hemorrhage however did show chronic findings (see below).  CT angio head and neck with and without contrast revealed multiple occlusions and other findings that based on Dr. Littlejohn's assessment appeared to be chronic.  He was not given TNK.  Upon returning from imaging, his NIH stroke scale was a 1, and his weakness, garbled speech, and facial droop had all resolved.  Patient says that he remembers the entire incident and had no precipitating symptoms such as chest pain, shortness of breath, lightheadedness, dizziness, visual changes, nausea, vomiting.  At present he denies having any pain other than the discomfort of sitting in the hospital bed.  His daughter is at bedside and reports that he is at his baseline mentally and physically in her assessment.  Patient to be admitted for further workup of stroke versus TIA versus seizure activity with neurology on consult.     ED course  Initial vitals: " Afebrile 36.4, HR 86, RR 16, /68, 98% on room air  Labs: CMP significant for BUN/creatinine of 26/2.02 (appears to be near baseline), normal electrolytes.  CBC reveals H&H 11.3/37.6 (baseline), no leukocytosis or thrombocytopenia.  PT/INR 1.1/12.9 respectively.  Imaging: CT brain attack head revealed no acute hemorrhage. Other findings are chronic- afua of  edema of the right posterior parietal and trigone region has density value would suggest subacute possibly chronic process, Right parotid gland mass. CT angio brain attack revealed chronic changes (see report below)  EKG: Normal sinus rhythm no acute injury pattern  ED interventions: Brain attack called and neurology consulted.    Patient admitted to general medicine service for stroke rule out, epilepsy evaluation.  MRI was obtained during inpatient stay.  MRI was indeterminate, with suggestions of acute versus subacute versus chronic findings.    Dr. Littlejohn neurology, believes the symptom presentation was consistent with seizure activity.  Recommended discharge on 250 mg twice daily Depakote.  Recommended 1 month outpatient follow-up with Dr. Lebron.  Patient back to baseline.  Patient medically stable for discharge at this time.    Pertinent Physical Exam At Time of Discharge  Physical Exam  Constitutional:       Appearance: Normal appearance. He is normal weight.   HENT:      Head: Normocephalic and atraumatic.      Nose: Nose normal.      Mouth/Throat:      Mouth: Mucous membranes are moist.      Pharynx: Oropharynx is clear.   Eyes:      Extraocular Movements: Extraocular movements intact.      Conjunctiva/sclera: Conjunctivae normal.      Pupils: Pupils are equal, round, and reactive to light.   Cardiovascular:      Rate and Rhythm: Normal rate and regular rhythm.      Pulses: Normal pulses.      Heart sounds: Normal heart sounds.   Pulmonary:      Effort: Pulmonary effort is normal.      Breath sounds: Normal breath sounds.   Abdominal:       General: Abdomen is flat. Bowel sounds are normal.      Palpations: Abdomen is soft.   Musculoskeletal:         General: Normal range of motion.      Cervical back: Normal range of motion and neck supple.   Skin:     General: Skin is warm and dry.      Capillary Refill: Capillary refill takes less than 2 seconds.   Neurological:      General: No focal deficit present.      Mental Status: He is alert and oriented to person, place, and time. Mental status is at baseline.   Psychiatric:         Mood and Affect: Mood normal.         Behavior: Behavior normal.         Outpatient Follow-Up  No future appointments.      Vu Carter MD

## 2024-04-09 LAB
ATRIAL RATE: 58 BPM
P AXIS: 46 DEGREES
P OFFSET: 172 MS
P ONSET: 129 MS
PR INTERVAL: 186 MS
Q ONSET: 222 MS
QRS COUNT: 10 BEATS
QRS DURATION: 96 MS
QT INTERVAL: 440 MS
QTC CALCULATION(BAZETT): 431 MS
QTC FREDERICIA: 434 MS
R AXIS: -11 DEGREES
T AXIS: 190 DEGREES
T OFFSET: 442 MS
VENTRICULAR RATE: 58 BPM

## 2024-04-21 LAB
ATRIAL RATE: 69 BPM
ATRIAL RATE: 77 BPM
P AXIS: 46 DEGREES
P AXIS: 63 DEGREES
P OFFSET: 178 MS
P OFFSET: 178 MS
P ONSET: 132 MS
P ONSET: 145 MS
PR INTERVAL: 152 MS
PR INTERVAL: 178 MS
Q ONSET: 221 MS
Q ONSET: 221 MS
QRS COUNT: 11 BEATS
QRS COUNT: 13 BEATS
QRS DURATION: 94 MS
QRS DURATION: 96 MS
QT INTERVAL: 350 MS
QT INTERVAL: 432 MS
QTC CALCULATION(BAZETT): 396 MS
QTC CALCULATION(BAZETT): 462 MS
QTC FREDERICIA: 380 MS
QTC FREDERICIA: 452 MS
R AXIS: 62 DEGREES
R AXIS: 96 DEGREES
T AXIS: 243 DEGREES
T AXIS: 254 DEGREES
T OFFSET: 396 MS
T OFFSET: 437 MS
VENTRICULAR RATE: 69 BPM
VENTRICULAR RATE: 77 BPM

## 2024-05-26 ENCOUNTER — HOSPITAL ENCOUNTER (EMERGENCY)
Facility: HOSPITAL | Age: 84
Discharge: HOME | End: 2024-05-27
Attending: STUDENT IN AN ORGANIZED HEALTH CARE EDUCATION/TRAINING PROGRAM
Payer: MEDICARE

## 2024-05-26 ENCOUNTER — APPOINTMENT (OUTPATIENT)
Dept: CARDIOLOGY | Facility: HOSPITAL | Age: 84
End: 2024-05-26
Payer: MEDICARE

## 2024-05-26 ENCOUNTER — APPOINTMENT (OUTPATIENT)
Dept: RADIOLOGY | Facility: HOSPITAL | Age: 84
End: 2024-05-26
Payer: MEDICARE

## 2024-05-26 DIAGNOSIS — S42.435B: Primary | ICD-10-CM

## 2024-05-26 DIAGNOSIS — S52.122A CLOSED DISPLACED FRACTURE OF HEAD OF LEFT RADIUS, INITIAL ENCOUNTER: ICD-10-CM

## 2024-05-26 DIAGNOSIS — S62.172A CLOSED DISPLACED FRACTURE OF TRAPEZIUM OF LEFT WRIST, INITIAL ENCOUNTER: ICD-10-CM

## 2024-05-26 DIAGNOSIS — R60.1 GENERALIZED EDEMA: ICD-10-CM

## 2024-05-26 LAB — GLUCOSE BLD MANUAL STRIP-MCNC: 92 MG/DL (ref 74–99)

## 2024-05-26 PROCEDURE — 82947 ASSAY GLUCOSE BLOOD QUANT: CPT

## 2024-05-26 PROCEDURE — 73200 CT UPPER EXTREMITY W/O DYE: CPT | Mod: 59,LT,MUE

## 2024-05-26 PROCEDURE — 73200 CT UPPER EXTREMITY W/O DYE: CPT | Mod: 59,LT

## 2024-05-26 PROCEDURE — 73200 CT UPPER EXTREMITY W/O DYE: CPT | Mod: LT

## 2024-05-26 PROCEDURE — 93971 EXTREMITY STUDY: CPT | Performed by: INTERNAL MEDICINE

## 2024-05-26 PROCEDURE — 93971 EXTREMITY STUDY: CPT

## 2024-05-26 PROCEDURE — 99285 EMERGENCY DEPT VISIT HI MDM: CPT | Mod: 25

## 2024-05-26 PROCEDURE — 29105 APPLICATION LONG ARM SPLINT: CPT | Mod: LT

## 2024-05-26 PROCEDURE — 99284 EMERGENCY DEPT VISIT MOD MDM: CPT | Performed by: STUDENT IN AN ORGANIZED HEALTH CARE EDUCATION/TRAINING PROGRAM

## 2024-05-26 PROCEDURE — 93005 ELECTROCARDIOGRAM TRACING: CPT

## 2024-05-26 ASSESSMENT — PAIN DESCRIPTION - PAIN TYPE: TYPE: ACUTE PAIN

## 2024-05-26 ASSESSMENT — PAIN DESCRIPTION - LOCATION: LOCATION: ARM

## 2024-05-26 ASSESSMENT — PAIN DESCRIPTION - ORIENTATION: ORIENTATION: LEFT

## 2024-05-26 ASSESSMENT — COLUMBIA-SUICIDE SEVERITY RATING SCALE - C-SSRS
2. HAVE YOU ACTUALLY HAD ANY THOUGHTS OF KILLING YOURSELF?: NO
6. HAVE YOU EVER DONE ANYTHING, STARTED TO DO ANYTHING, OR PREPARED TO DO ANYTHING TO END YOUR LIFE?: NO
1. IN THE PAST MONTH, HAVE YOU WISHED YOU WERE DEAD OR WISHED YOU COULD GO TO SLEEP AND NOT WAKE UP?: NO

## 2024-05-26 ASSESSMENT — PAIN - FUNCTIONAL ASSESSMENT: PAIN_FUNCTIONAL_ASSESSMENT: 0-10

## 2024-05-26 ASSESSMENT — PAIN SCALES - GENERAL: PAINLEVEL_OUTOF10: 6

## 2024-05-26 NOTE — ED PROVIDER NOTES
HPI   Chief Complaint   Patient presents with    Arm Injury     Pt here for CT scan of left arm per  at nursing home.       84-year-old male presenting to ED for 3 weeks of left arm pain has been ongoing for the last 3 weeks after which he suffered mechanical fall and broke his fall with his left hand and arm, but was not evaluated at that time but has had persistent pain since then, outpatient x-rays did not demonstrate any abnormalities.  She denies any numbness to the area, he does not have any chest pain or shortness of breath or other symptoms has not fallen since then.                          Estuardo Coma Scale Score: 15                     Patient History   No past medical history on file.  No past surgical history on file.  No family history on file.  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   HENT:      Head: Normocephalic and atraumatic.      Mouth/Throat:      Mouth: Mucous membranes are moist.   Eyes:      Extraocular Movements: Extraocular movements intact.   Cardiovascular:      Rate and Rhythm: Normal rate and regular rhythm.      Heart sounds: Normal heart sounds. No murmur heard.  Pulmonary:      Effort: Pulmonary effort is normal. No respiratory distress.      Breath sounds: Normal breath sounds. No wheezing.   Abdominal:      General: There is no distension.      Palpations: Abdomen is soft.      Tenderness: There is no abdominal tenderness.  There is no guarding.   Musculoskeletal:      Right lower leg: No edema.      Left lower leg: No edema.      Comments: There is no deformity noted to the left upper extremity there is no focal tenderness on palpation however there is a significant pain to passive and active range of motion of the wrist, elbow and shoulder    Skin:     General: Skin is warm and dry.   Neurological:      General: No focal deficit present.      Mental Status: He is alert and oriented to person, place, and time.   Psychiatric:         Mood and Affect: Mood normal.         Behavior: Behavior normal.         ED Course & MDM   Diagnoses as of 05/26/24 2347   Nondisplaced fracture (avulsion) of lateral epicondyle of left humerus, initial encounter for open fracture   Closed displaced fracture of head of left radius, initial encounter   Closed displaced fracture of trapezium of left wrist, initial encounter       Medical Decision Making  Patient presents to the ED for left arm pain. the patient presents he has had negative plain films as an outpatient. due to unclear etiology of his pain will obtain CT scan of the left upper extremity.     at this time do not believe that will require any further workup.     I did obtain EKG on him, this did not demonstrate any significant change from previous EKG and otherwise appears at baseline     CT scans obtained did demonstrate multiple nondisplaced fractures involving the radius, lateral epicondyle of the humerus and trapezium.     the patient was placed in long-arm splint along with a thumb spica splint.     patient will be discharged home to the rehab facility with outpatient follow-up with orthopedic surgery     discussed with the attending  Jase Vega DO PGY-4  Emergency Medicine        Procedure  Procedures     Jase Vega DO  Resident  05/26/24 9163

## 2024-05-27 VITALS
WEIGHT: 150 LBS | DIASTOLIC BLOOD PRESSURE: 65 MMHG | TEMPERATURE: 98.2 F | OXYGEN SATURATION: 98 % | HEIGHT: 71 IN | SYSTOLIC BLOOD PRESSURE: 158 MMHG | HEART RATE: 54 BPM | BODY MASS INDEX: 21 KG/M2 | RESPIRATION RATE: 16 BRPM

## 2024-05-27 NOTE — DISCHARGE INSTRUCTIONS
Please follow-up with the provided orthopedic surgeon.  Return to the emergency department if you develop any new, concerning, worsening symptoms.

## 2024-05-30 LAB
ATRIAL RATE: 62 BPM
P AXIS: 55 DEGREES
P OFFSET: 186 MS
P ONSET: 130 MS
PR INTERVAL: 176 MS
Q ONSET: 218 MS
QRS COUNT: 10 BEATS
QRS DURATION: 92 MS
QT INTERVAL: 438 MS
QTC CALCULATION(BAZETT): 444 MS
QTC FREDERICIA: 443 MS
R AXIS: -62 DEGREES
T AXIS: 114 DEGREES
T OFFSET: 437 MS
VENTRICULAR RATE: 62 BPM

## 2024-06-10 NOTE — PROGRESS NOTES
Subjective   Cuong Cheek is a 84 y.o.   male. [facility] 4-5-24, Dr. Littlejohn, from [facility], he no longer drives  HPI  PMH of CAD, HTN, HLD, etoh use, tobacco use, CKD, PAD, BPH is here being seen s/p hospitalization for stroke-like symptoms of left arm weakness, left facial droop, slurred speech, and vision changes. He resides at a SNF where staff states he had a fall and hit his head and they noticed him to be symptomatic. CTH showed not hemorrhage, possible subacute CVA right parietal lobe. Subacute infarct vs. Seizure with BAILEY occlusion was impression. Continued with aspirin and HIT. MRI brain showed restricted diffusion along the lateral margin of the right occipital lobe in the region of encephalomalacia due to acute or subacute on chronic ischemia, seizure activity, and/or artifact. He was started on Depakote 250mg BID for seizure prevention. He again presented to the ED for CT scan of LUE s/p previous fall and imaging showed multiple nondisplaced fractures involving the radius, lateral epicondyle of the humerus and trapezium. The patient was placed in long-arm splint along with a thumb spica splint.  Review of systems are negative unless otherwise specified in HPI.   Objective   Neurological Exam  Physical Exam  I personally reviewed laboratory, radiographic, and medical studies which were pertinent for ***.    Assessment/Plan   Discussed following up in? _ months. Medication order sent to pharmacy.     Discussed taking seizure medication as prescribed and not missing doses. Patient aware to alert office of any new seizure activity. Discussed if patient has a breakthrough seizure, they should use their abortive treatment. They should not drive a car or operate heavy machinery for 6 months until they are seizure free and cleared by a provider.?

## 2024-06-17 ENCOUNTER — APPOINTMENT (OUTPATIENT)
Dept: NEUROLOGY | Facility: CLINIC | Age: 84
End: 2024-06-17
Payer: MEDICARE